# Patient Record
Sex: FEMALE | Race: WHITE | Employment: OTHER | URBAN - METROPOLITAN AREA
[De-identification: names, ages, dates, MRNs, and addresses within clinical notes are randomized per-mention and may not be internally consistent; named-entity substitution may affect disease eponyms.]

---

## 2018-05-01 ENCOUNTER — OFFICE VISIT (OUTPATIENT)
Dept: NEUROLOGY | Age: 78
End: 2018-05-01

## 2018-05-01 VITALS
DIASTOLIC BLOOD PRESSURE: 74 MMHG | WEIGHT: 106.8 LBS | HEIGHT: 62 IN | BODY MASS INDEX: 19.65 KG/M2 | SYSTOLIC BLOOD PRESSURE: 122 MMHG | HEART RATE: 99 BPM | OXYGEN SATURATION: 98 %

## 2018-05-01 DIAGNOSIS — R47.02: Primary | ICD-10-CM

## 2018-05-01 RX ORDER — DIPHENHYDRAMINE HCL 25 MG
25 CAPSULE ORAL
COMMUNITY
End: 2020-04-17

## 2018-05-01 RX ORDER — ASPIRIN 81 MG/1
TABLET ORAL DAILY
COMMUNITY
End: 2020-04-17

## 2018-05-01 RX ORDER — PREDNISOLONE ACETATE 10 MG/ML
1 SUSPENSION/ DROPS OPHTHALMIC 4 TIMES DAILY
COMMUNITY
End: 2020-04-17

## 2018-05-01 RX ORDER — SODIUM CHLORIDE 50 MG/ML
1 SOLUTION/ DROPS OPHTHALMIC AS NEEDED
COMMUNITY
End: 2020-04-17

## 2018-05-01 RX ORDER — OFLOXACIN 3 MG/ML
3 SOLUTION/ DROPS OPHTHALMIC 4 TIMES DAILY
COMMUNITY
End: 2020-04-17

## 2018-05-01 NOTE — PATIENT INSTRUCTIONS
A Healthy Lifestyle: Care Instructions  Your Care Instructions    A healthy lifestyle can help you feel good, stay at a healthy weight, and have plenty of energy for both work and play. A healthy lifestyle is something you can share with your whole family. A healthy lifestyle also can lower your risk for serious health problems, such as high blood pressure, heart disease, and diabetes. You can follow a few steps listed below to improve your health and the health of your family. Follow-up care is a key part of your treatment and safety. Be sure to make and go to all appointments, and call your doctor if you are having problems. It's also a good idea to know your test results and keep a list of the medicines you take. How can you care for yourself at home? · Do not eat too much sugar, fat, or fast foods. You can still have dessert and treats now and then. The goal is moderation. · Start small to improve your eating habits. Pay attention to portion sizes, drink less juice and soda pop, and eat more fruits and vegetables. ¨ Eat a healthy amount of food. A 3-ounce serving of meat, for example, is about the size of a deck of cards. Fill the rest of your plate with vegetables and whole grains. ¨ Limit the amount of soda and sports drinks you have every day. Drink more water when you are thirsty. ¨ Eat at least 5 servings of fruits and vegetables every day. It may seem like a lot, but it is not hard to reach this goal. A serving or helping is 1 piece of fruit, 1 cup of vegetables, or 2 cups of leafy, raw vegetables. Have an apple or some carrot sticks as an afternoon snack instead of a candy bar. Try to have fruits and/or vegetables at every meal.  · Make exercise part of your daily routine. You may want to start with simple activities, such as walking, bicycling, or slow swimming. Try to be active 30 to 60 minutes every day. You do not need to do all 30 to 60 minutes all at once.  For example, you can exercise 3 times a day for 10 or 20 minutes. Moderate exercise is safe for most people, but it is always a good idea to talk to your doctor before starting an exercise program.  · Keep moving. [place] the lawn, work in the garden, or [employer]. Take the stairs instead of the elevator at work. · If you smoke, quit. People who smoke have an increased risk for heart attack, stroke, cancer, and other lung illnesses. Quitting is hard, but there are ways to boost your chance of quitting tobacco for good. ¨ Use nicotine gum, patches, or lozenges. ¨ Ask your doctor about stop-smoking programs and medicines. ¨ Keep trying. In addition to reducing your risk of diseases in the future, you will notice some benefits soon after you stop using tobacco. If you have shortness of breath or asthma symptoms, they will likely get better within a few weeks after you quit. · Limit how much alcohol you drink. Moderate amounts of alcohol (up to 2 drinks a day for men, 1 drink a day for women) are okay. But drinking too much can lead to liver problems, high blood pressure, and other health problems. Family health  If you have a family, there are many things you can do together to improve your health. · Eat meals together as a family as often as possible. · Eat healthy foods. This includes fruits, vegetables, lean meats and dairy, and whole grains. · Include your family in your fitness plan. Most people think of activities such as jogging or tennis as the way to fitness, but there are many ways you and your family can be more active. Anything that makes you breathe hard and gets your heart pumping is exercise. Here are some tips:  ¨ Walk to do errands or to take your child to school or the bus. ¨ Go for a family bike ride after dinner instead of watching TV. Where can you learn more? Go to http://karina-zacarias.info/. Enter Z675 in the search box to learn more about \"A Healthy Lifestyle: Care Instructions. \"  Current as of: May 12, 2017  Content Version: 11.4  © 3966-5451 Healthwise, [employer]. Care instructions adapted under license by [employer]Â® (which disclaims liability or warranty for this information). If you have questions about a medical condition or this instruction, always ask your healthcare professional. [address] any warranty or liability for your use of this information. Please be advised there is a $25 fee for all paperwork to be completed from our  providers. This is to be paid by the patient prior to picking up the completed forms.

## 2018-05-01 NOTE — MR AVS SNAPSHOT
[address], 
[id number], [address] [place] [phone] Patient: Felicitas Mccormack MRN: [id number] ZYL:[id number] Visit Information Date & Time Provider Department Dept. Phone Encounter #  
 5/1/2018  8:00 AM Reba Phelps MD [facility] [phone] [id number] Follow-up Instructions Return if symptoms worsen or fail to improve. Upcoming Health Maintenance Date Due DTaP/Tdap/Td series (1 - Tdap) 5/26/1961 ZOSTER VACCINE AGE 60> 3/26/2000 GLAUCOMA SCREENING Q2Y 5/26/2005 Bone Densitometry (Dexa) Screening 5/26/2005 Pneumococcal 65+ Low/Medium Risk (1 of 2 - PCV13) 5/26/2005 MEDICARE YEARLY EXAM 3/20/2018 Influenza Age 5 to Adult 8/1/2018 Allergies as of 5/1/2018  Review Complete On: 5/1/2018 By: Isis Larson No Known Allergies Current Immunizations  Never Reviewed No immunizations on file. Not reviewed this visit You Were Diagnosed With   
  
 Codes Comments Fluent dysphasia    -  Primary ICD-10-CM: R47.02 
ICD-9-CM: 784.59 Vitals BP Pulse Height(growth percentile) Weight(growth percentile) SpO2 BMI  
 122/74 99 5' 2\" (1.575 m) 106 lb 12.8 oz (48.4 kg) 98% 19.53 kg/m2 OB Status Smoking Status Premenopausal Never Smoker BMI and BSA Data Body Mass Index Body Surface Area  
 19.53 kg/m 2 1.46 m 2 Preferred Pharmacy Pharmacy Name Phone [id number] [facility] [phone] Your Updated Medication List  
  
   
This list is accurate as of 5/1/18  8:30 AM.  Always use your most recent med list.  
  
  
  
  
 aspirin delayed-release 81 mg tablet Take  by mouth daily. BENADRYL 25 mg capsule Generic drug:  diphenhydrAMINE Take 25 mg by mouth every six (6) hours as needed. NAIF 128 5 % ophthalmic solution Generic drug:  sodium chloride Administer 1 Drop to both eyes as needed. ofloxacin 0.3 % ophthalmic solution Commonly known as:  FLOXIN Administer 3 Drops to both eyes four (4) times daily. prednisoLONE acetate 1 % ophthalmic suspension Commonly known as:  PRED FORTE Administer 1 Drop to both eyes four (4) times daily. Follow-up Instructions Return if symptoms worsen or fail to improve. To-Do List   
 05/19/2018 Imaging:  MRI BRAIN W WO CONT Patient Instructions A Healthy Lifestyle: Care Instructions Your Care Instructions A healthy lifestyle can help you feel good, stay at a healthy weight, and have plenty of energy for both work and play. A healthy lifestyle is something you can share with your whole family. A healthy lifestyle also can lower your risk for serious health problems, such as high blood pressure, heart disease, and diabetes. You can follow a few steps listed below to improve your health and the health of your family. Follow-up care is a key part of your treatment and safety. Be sure to make and go to all appointments, and call your doctor if you are having problems. It's also a good idea to know your test results and keep a list of the medicines you take. How can you care for yourself at home? · Do not eat too much sugar, fat, or fast foods. You can still have dessert and treats now and then. The goal is moderation. · Start small to improve your eating habits. Pay attention to portion sizes, drink less juice and soda pop, and eat more fruits and vegetables. ¨ Eat a healthy amount of food. A 3-ounce serving of meat, for example, is about the size of a deck of cards. Fill the rest of your plate with vegetables and whole grains. ¨ Limit the amount of soda and sports drinks you have every day. Drink more water when you are thirsty. ¨ Eat at least 5 servings of fruits and vegetables every day.  It may seem like a lot, but it is not hard to reach this goal. A serving or helping is 1 piece of fruit, 1 cup of vegetables, or 2 cups of leafy, raw vegetables. Have an apple or some carrot sticks as an afternoon snack instead of a candy bar. Try to have fruits and/or vegetables at every meal. 
· Make exercise part of your daily routine. You may want to start with simple activities, such as walking, bicycling, or slow swimming. Try to be active 30 to 60 minutes every day. You do not need to do all 30 to 60 minutes all at once. For example, you can exercise 3 times a day for 10 or 20 minutes. Moderate exercise is safe for most people, but it is always a good idea to talk to your doctor before starting an exercise program. 
· Keep moving. [place] [employer] the lawn, work in the garden, or [employer]. Take the stairs instead of the elevator at work. · If you smoke, quit. People who smoke have an increased risk for heart attack, stroke, cancer, and other lung illnesses. Quitting is hard, but there are ways to boost your chance of quitting tobacco for good. ¨ Use nicotine gum, patches, or lozenges. ¨ Ask your doctor about stop-smoking programs and medicines. ¨ Keep trying. In addition to reducing your risk of diseases in the future, you will notice some benefits soon after you stop using tobacco. If you have shortness of breath or asthma symptoms, they will likely get better within a few weeks after you quit. · Limit how much alcohol you drink. Moderate amounts of alcohol (up to 2 drinks a day for men, 1 drink a day for women) are okay. But drinking too much can lead to liver problems, high blood pressure, and other health problems. Family health If you have a family, there are many things you can do together to improve your health. · Eat meals together as a family as often as possible. · Eat healthy foods. This includes fruits, vegetables, lean meats and dairy, and whole grains. · Include your family in your fitness plan. Most people think of activities such as jogging or tennis as the way to fitness, but there are many ways you and your family can be more active. Anything that makes you breathe hard and gets your heart pumping is exercise. Here are some tips: 
¨ Walk to do errands or to take your child to school or the bus. ¨ Go for a family bike ride after dinner instead of watching TV. Where can you learn more? Go to http://karina-zacarias.info/. Enter B782 in the search box to learn more about \"A Healthy Lifestyle: Care Instructions. \" Current as of: May 12, 2017 Content Version: 11.4 © 0857-0202 [employer]. Care instructions adapted under license by [employer] (which disclaims liability or warranty for this information). If you have questions about a medical condition or this instruction, always ask your healthcare professional. [place] [address] any warranty or liability for your use of this information. Please be advised there is a $25 fee for all paperwork to be completed from our  providers. This is to be paid by the patient prior to picking up the completed forms. Introducing [facility]! [employer] introduces Twice patient portal. Now you can access parts of your medical record, email your doctor's office, and request medication refills online. 1. In your internet browser, go to [email or web address] 2. Click on the First Time User? Click Here link in the Sign In box. You will see the New Member Sign Up page. 3. Enter your Twice Access Code exactly as it appears below. You will not need to use this code after youve completed the sign-up process. If you do not sign up before the expiration date, you must request a new code. · Twice Access Code: [email or web address] Expires: 7/30/2018  7:42 AM 
 
4.  Enter the last four digits of your Social Security Number (xxxx) and Date of Birth (mm/dd/yyyy) as indicated and click Submit. You will be taken to the next sign-up page. 5. Create a DentLight ID. This will be your DentLight login ID and cannot be changed, so think of one that is secure and easy to remember. 6. Create a DentLight password. You can change your password at any time. 7. Enter your Password Reset Question and Answer. This can be used at a later time if you forget your password. 8. Enter your e-mail address. You will receive e-mail notification when new information is available in [address] 9. Click Sign Up. You can now view and download portions of your medical record. 10. Click the Download Summary menu link to download a portable copy of your medical information. If you have questions, please visit the Frequently Asked Questions section of the DentLight website. Remember, DentLight is NOT to be used for urgent needs. For medical emergencies, dial 911. Now available from your iPhone and Android! Please provide this summary of care documentation to your next provider. Your primary care clinician is listed as PROVIDER UNKNOWN. If you have any questions after today's visit, please call [phone].

## 2018-05-01 NOTE — PROGRESS NOTES
HISTORY OF PRESENT ILLNESS  Willette Spurling is a 68 y.o. female. HPI Comments: This patient is a 66-year-old right-handed  female who comes in today stating that her children are concerned that she is dementing. She states he sometimes searches for words, her children state that she is forgetful and are worried about her. She does not believe she has a problem except she does admit to word finding difficulties. She is only on aspirin 81 mg a day. She is remarkably healthy. She did have cataract surgery twice in April 2018. She has one paternal aunt who was demented at death. She otherwise has a family history of cancer heart disease Parkinson's disease in a paternal grandmother and stroke in multiple individuals. Her mother's sister and multiple remote relatives have diabetes, she does not. She denies any problems getting lost driving the car she feels she is functioning normally. New Patient   The history is provided by the patient. This is a chronic problem. Review of Systems   Constitutional:        View of systems is positive for anxiety occasionally, fatigue occasionally, hearing loss, she has hearing aids. Occasional memory problems, occasional muscle weakness poor appetite a rash from her psoriasis, complete review of systems done all others negative     Medications  Aspirin 81 mg a day, Benadryl 25 mg 2 nightly for sleep,    Past medical history  She has had multiple surgeries, appendectomy, uterine surgery, cataract. Family history as noted above. Social history, she lives alone she does not smoke he does not drink alcohol. He continues to operate a motor vehicle. Physical Exam   She scored 29 out of 30 on her MMSE, the only point lost was on the ability to repeat a tongue twister phrase which we tried twice and probably is significant. Constitutional: Oriented to person, place, and time, appears well-developed and well-nourished. No distress.    HENT:   Head: Normocephalic and atraumatic. Mouth/Throat: Oropharynx is clear and moist. No oropharyngeal exudate. Eyes: Conjunctivae and EOM are normal. Pupils are equal, round, and reactive to light. No scleral icterus. Neck: Normal range of motion. Neck supple. No thyromegaly present. Cardiovascular: Normal rate, regular rhythm and normal heart sounds. Musculoskeletal: Normal range of motion, exhibits no edema, tenderness or deformity. Lymphadenopathy: no cervical adenopathy. Neurological: Alert and oriented to person, place, and time. Normal strength and normal reflexes. Displays no atrophy and no tremor. No cranial nerve deficit or sensory deficit. Exhibits normal muscle tone. Displays a negative Romberg sign, no seizure activity. Coordination normal, gait normal.   No Babinski's sign on the right side. No Babinski's sign on the left side. Speech, language and mentation are normal  Visual fields are full to confrontation, funduscopic exam reveals flat discs, the retina and vasculature are normal   Skin: Skin is warm and dry. No rash noted, not diaphoretic. No erythema. Psychiatric: Normal mood and affect,  behavior is normal. Judgment and thought content normal.   Vitals reviewed. ASSESSMENT and PLAN  DYSPHASIA  I suspect this patient does have a dysphasia, her only error was on the word repetition. She certainly does not have a dementia. I will set her up for an MRI scan of her brain with contrast to make sure that I do not see any abnormality in the left temporoparietal area. It would not surprise me if I saw a small lacunar infarct in that area. If her scan is normal I am not going to pursue this further. If she does have a small stroke in that area we will give her a stroke workup. She is currently taking aspirin 81 mg a day which I recommend she continue. I will plan to see her back on an as-needed basis. This note will not be viewable in [address]

## 2018-05-10 ENCOUNTER — HOSPITAL ENCOUNTER (OUTPATIENT)
Dept: MRI IMAGING | Age: 78
Discharge: HOME | End: 2018-05-10
Attending: PSYCHIATRY & NEUROLOGY
Payer: MEDICARE

## 2018-05-10 DIAGNOSIS — R47.02: ICD-10-CM

## 2018-05-10 PROCEDURE — 74011250636 HC RX REV CODE- 250/636: Performed by: PSYCHIATRY & NEUROLOGY

## 2018-05-10 PROCEDURE — A9575 INJ GADOTERATE MEGLUMI 0.1ML: HCPCS | Performed by: PSYCHIATRY & NEUROLOGY

## 2018-05-10 PROCEDURE — 70553 MRI BRAIN STEM W/O & W/DYE: CPT

## 2018-05-10 RX ORDER — GADOTERATE MEGLUMINE 376.9 MG/ML
9 INJECTION INTRAVENOUS
Status: COMPLETED | OUTPATIENT
Start: 2018-05-10 | End: 2018-05-10

## 2018-05-10 RX ADMIN — GADOTERATE MEGLUMINE 9 ML: 376.9 INJECTION INTRAVENOUS at 14:00

## 2019-02-26 ENCOUNTER — TELEPHONE (OUTPATIENT)
Dept: NEUROLOGY | Age: 79
End: 2019-02-26

## 2019-05-21 ENCOUNTER — TELEPHONE (OUTPATIENT)
Dept: NEUROLOGY | Age: 79
End: 2019-05-21

## 2019-06-06 ENCOUNTER — OFFICE VISIT (OUTPATIENT)
Dept: NEUROLOGY | Age: 79
End: 2019-06-06

## 2019-06-06 VITALS
HEIGHT: 62 IN | BODY MASS INDEX: 19.88 KG/M2 | OXYGEN SATURATION: 97 % | WEIGHT: 108 LBS | HEART RATE: 81 BPM | DIASTOLIC BLOOD PRESSURE: 80 MMHG | SYSTOLIC BLOOD PRESSURE: 110 MMHG

## 2019-06-06 DIAGNOSIS — F03.90 DEMENTIA WITHOUT BEHAVIORAL DISTURBANCE, UNSPECIFIED DEMENTIA TYPE: Primary | ICD-10-CM

## 2019-06-06 RX ORDER — MEMANTINE HYDROCHLORIDE 10 MG/1
TABLET ORAL
Qty: 60 TAB | Refills: 11 | Status: SHIPPED | OUTPATIENT
Start: 2019-06-06 | End: 2020-04-17

## 2019-06-06 RX ORDER — DONEPEZIL HYDROCHLORIDE 10 MG/1
TABLET, FILM COATED ORAL
Qty: 30 TAB | Refills: 5 | Status: SHIPPED | OUTPATIENT
Start: 2019-06-06 | End: 2020-04-17

## 2019-06-06 RX ORDER — TRIAMCINOLONE ACETONIDE 55 UG/1
2 SPRAY, METERED NASAL DAILY
COMMUNITY
End: 2020-04-17

## 2019-06-06 NOTE — PROGRESS NOTES
HISTORY OF PRESENT ILLNESS  Shona Vyas is a 78 y.o. female. This patient is a 51-year-old right-handed  female who comes in today stating that her children are concerned that she is dementing. She states he sometimes searches for words, her children state that she is forgetful and are worried about her. She does not believe she has a problem except she does admit to word finding difficulties. She is only on aspirin 81 mg a day. She is remarkably healthy. She did have cataract surgery twice in April 2018. She has one paternal aunt who was demented at death. She otherwise has a family history of cancer heart disease Parkinson's disease in a paternal grandmother and stroke in multiple individuals. Her mother's sister and multiple remote relatives have diabetes, she does not. She denies any problems getting lost driving the car she feels she is functioning normally. Her son comes with her today and he states that her memory is failing, she clearly has any aphasia, this is much more pronounced than any other deficit. She does very well on the MMSE again with a score of 24 however the son states that she is often very forgetful at home. She does operate a motor vehicle and does not get lost.    New Patient   The history is provided by the patient. This is a chronic problem. Review of Systems   Constitutional:        View of systems is positive for anxiety occasionally, fatigue occasionally, hearing loss, she has hearing aids. Occasional memory problems, occasional muscle weakness poor appetite a rash from her psoriasis, complete review of systems done all others negative       Current Outpatient Medications:     triamcinolone (NASACORT AQ) 55 mcg nasal inhaler, 2 Sprays daily. 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 , Disp: , Rfl:     aspirin delayed-release 81 mg tablet, Take  by mouth daily. , Disp: , Rfl:     prednisoLONE acetate (PRED FORTE) 1 % ophthalmic suspension, Administer 1 Drop to both eyes four (4) times daily. , Disp: , Rfl:     sodium chloride (NAIF 128) 5 % ophthalmic solution, Administer 1 Drop to both eyes as needed. , Disp: , Rfl:     ofloxacin (FLOXIN) 0.3 % ophthalmic solution, Administer 3 Drops to both eyes four (4) times daily. , Disp: , Rfl:     History reviewed. No pertinent past medical history. No family history on file. Social History     Tobacco Use    Smoking status: Never Smoker    Smokeless tobacco: Current User   Substance Use Topics    Alcohol use: No    Drug use: No     /80   Pulse 81   Ht 5' 2\" (1.575 m)   Wt 108 lb (49 kg)   SpO2 97%   BMI 19.75 kg/m²     Physical Exam   She scored 26 out of 30 on her MMSE, the only point lost was on the ability to repeat a tongue twister phrase which we tried twice and probably is significant. Constitutional: Oriented to person, place, and time, appears well-developed and well-nourished. No distress. HENT:   Head: Normocephalic and atraumatic. Mouth/Throat: Oropharynx is clear and moist. No oropharyngeal exudate. Eyes: Conjunctivae and EOM are normal. Pupils are equal, round, and reactive to light. No scleral icterus. Neck: Normal range of motion. Neck supple. No thyromegaly present. Cardiovascular: Normal rate, regular rhythm and normal heart sounds. Musculoskeletal: Normal range of motion, exhibits no edema, tenderness or deformity. Lymphadenopathy: no cervical adenopathy. Neurological: Alert and oriented to person, place, and time. Normal strength and normal reflexes.    Displays no atrophy and no tremor. No cranial nerve deficit or sensory deficit. Exhibits normal muscle tone. Displays a negative Romberg sign, no seizure activity. Coordination normal, gait normal.   No Babinski's sign on the right side. No Babinski's sign on the left side. Speech, language and mentation are normal  Visual fields are full to confrontation, funduscopic exam reveals flat discs, the retina and vasculature are normal   Skin: Skin is warm and dry. No rash noted, not diaphoretic. No erythema. Psychiatric: Normal mood and affect,  behavior is normal. Judgment and thought content normal.   Vitals reviewed. MRI Results (most recent):  Results from [place] encounter on 05/10/18   MRI BRAIN W WO CONT    Narrative Clinical indication: Dysphasia    Technical factors: Diffusion imaging, sagittal T1-weighted, axial T1-weighted  T2-weighted FLAIR gradient echo axial T1-weighted postcontrast, 9 cc IV dotarem. Coronal T1-weighted postcontrast.    Diffusion imaging does not show acute ischemic changes her. There is no  extra-axial fluid collection hemorrhage or shift. Minimal nonspecific white  matter disease. Ventricles are normal in size and midline, major flow voids in  the vessels at the base of the brain are present. No enhancing lesion or masses  . Impression  impression: Minimal white matter disease, no other significant findings. 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 Her MRI scan is normal, there is nothing else to be done besides to try her on memory medications. I will start her off on donepezil 10 mg, half a tablet a day for 30 days and then up to 1 whole tablet a day. She will then start memantine 10 mg a day for 30 days and then 10 mg twice a day. I will see her back in 6 months.

## 2019-06-06 NOTE — LETTER
6/6/2019 11:59 AM 
 
Patient:  Collin Pelaez YOB: 1940 Date of Visit: 6/6/2019 Dear Luz Bello MD 
[address] [place] [address] Road [address] [place] [address] VIA In Basket 
 : Thank you for referring Ms. Isamar Wadsworth to me for evaluation/treatment. Below are the relevant portions of my assessment and plan of care. HISTORY OF PRESENT ILLNESS Collin Pelaez is a 78 y.o. female. This patient is a 79-year-old right-handed  female who comes in today stating that her children are concerned that she is dementing. She states he sometimes searches for words, her children state that she is forgetful and are worried about her. She does not believe she has a problem except she does admit to word finding difficulties. She is only on aspirin 81 mg a day. She is remarkably healthy. She did have cataract surgery twice in April 2018. She has one paternal aunt who was demented at death. She otherwise has a family history of cancer heart disease Parkinson's disease in a paternal grandmother and stroke in multiple individuals. Her mother's sister and multiple remote relatives have diabetes, she does not. She denies any problems getting lost driving the car she feels she is functioning normally. New Patient The history is provided by the patient. This is a chronic problem. Review of Systems Constitutional:  
     View of systems is positive for anxiety occasionally, fatigue occasionally, hearing loss, she has hearing aids. Occasional memory problems, occasional muscle weakness poor appetite a rash from her psoriasis, complete review of systems done all others negative Medications Aspirin 81 mg a day, Benadryl 25 mg 2 nightly for sleep, Past medical history She has had multiple surgeries, appendectomy, uterine surgery, cataract. Family history as noted above.  
 
Social history, she lives alone she does not smoke he does not drink alcohol. He continues to operate a motor vehicle. Physical Exam  
She scored 29 out of 30 on her MMSE, the only point lost was on the ability to repeat a tongue twister phrase which we tried twice and probably is not significant. Constitutional: Oriented to person, place, and time, appears well-developed and well-nourished. No distress. HENT:  
Head: Normocephalic and atraumatic. Mouth/Throat: Oropharynx is clear and moist. No oropharyngeal exudate. Eyes: Conjunctivae and EOM are normal. Pupils are equal, round, and reactive to light. No scleral icterus. Neck: Normal range of motion. Neck supple. No thyromegaly present. Cardiovascular: Normal rate, regular rhythm and normal heart sounds. Musculoskeletal: Normal range of motion, exhibits no edema, tenderness or deformity. Lymphadenopathy: no cervical adenopathy. Neurological: Alert and oriented to person, place, and time. Normal strength and normal reflexes. Displays no atrophy and no tremor. No cranial nerve deficit or sensory deficit. Exhibits normal muscle tone. Displays a negative Romberg sign, no seizure activity. Coordination normal, gait normal.  
No Babinski's sign on the right side. No Babinski's sign on the left side. Speech, language and mentation are normal 
Visual fields are full to confrontation, funduscopic exam reveals flat discs, the retina and vasculature are normal  
Skin: Skin is warm and dry. No rash noted, not diaphoretic. No erythema. Psychiatric: Normal mood and affect,  behavior is normal. Judgment and thought content normal.  
Vitals reviewed. MRI Results (most recent): 
Results from Hospital Encounter encounter on 05/10/18 MRI BRAIN W WO CONT Narrative Clinical indication: Dysphasia Technical factors: Diffusion imaging, sagittal T1-weighted, axial T1-weighted T2-weighted FLAIR gradient echo axial T1-weighted postcontrast, 9 cc IV dotarem.  
Coronal T1-weighted postcontrast. 
 
 Diffusion imaging does not show acute ischemic changes her. There is no 
extra-axial fluid collection hemorrhage or shift. Minimal nonspecific white 
matter disease. Ventricles are normal in size and midline, major flow voids in 
the vessels at the base of the brain are present. No enhancing lesion or masses Chris De La Cruz Impression  impression: Minimal white matter disease, no other significant findings. ASSESSMENT and PLAN 
DYSPHASIA I suspect this patient does have a dysphasia, her only error was on the word repetition. She certainly does not have a dementia. I will set her up for an MRI scan of her brain with contrast to make sure that I do not see any abnormality in the left temporoparietal area. It would not surprise me if I saw a small lacunar infarct in that area. If her scan is normal I am not going to pursue this further. If she does have a small stroke in that area we will give her a stroke workup. She is currently taking aspirin 81 mg a day which I recommend she continue. I will plan to see her back on an as-needed basis. This note will not be viewable in [address] HISTORY OF PRESENT ILLNESS Jonathan Pickett is a 78 y.o. female. This patient is a 80-year-old right-handed  female who comes in today stating that her children are concerned that she is dementing. She states he sometimes searches for words, her children state that she is forgetful and are worried about her. She does not believe she has a problem except she does admit to word finding difficulties. She is only on aspirin 81 mg a day. She is remarkably healthy. She did have cataract surgery twice in April 2018. She has one paternal aunt who was demented at death. She otherwise has a family history of cancer heart disease Parkinson's disease in a paternal grandmother and stroke in multiple individuals.   Her mother's sister and multiple remote relatives have diabetes, she does not. She denies any problems getting lost driving the car she feels she is functioning normally. Her son comes with her today and he states that her memory is failing, she clearly has any aphasia, this is much more pronounced than any other deficit. She does very well on the MMSE again with a score of 24 however the son states that she is often very forgetful at home. She does operate a motor vehicle and does not get lost. 
 
New Patient The history is provided by the patient. This is a chronic problem. Review of Systems Constitutional:  
     View of systems is positive for anxiety occasionally, fatigue occasionally, hearing loss, she has hearing aids. Occasional memory problems, occasional muscle weakness poor appetite a rash from her psoriasis, complete review of systems done all others negative Current Outpatient Medications:  
  triamcinolone (NASACORT AQ) 55 mcg nasal inhaler, 2 Sprays daily. , Disp: , Rfl:  
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 
  memantine (NAMENDA) 10 mg tablet, 1 po q  Day for 30 days the 1 po bid, may start after at full dose of donepexil for 30 days, Disp: 60 Tab, Rfl: 11 
  diphenhydrAMINE (BENADRYL) 25 mg capsule, Take 25 mg by mouth every six (6) hours as needed. , Disp: , Rfl:  
  aspirin delayed-release 81 mg tablet, Take  by mouth daily. , Disp: , Rfl:  
  prednisoLONE acetate (PRED FORTE) 1 % ophthalmic suspension, Administer 1 Drop to both eyes four (4) times daily. , Disp: , Rfl:  
  sodium chloride (NAIF 128) 5 % ophthalmic solution, Administer 1 Drop to both eyes as needed. , Disp: , Rfl:  
  ofloxacin (FLOXIN) 0.3 % ophthalmic solution, Administer 3 Drops to both eyes four (4) times daily. , Disp: , Rfl:  
 
History reviewed. No pertinent past medical history. No family history on file. Social History Tobacco Use  Smoking status: Never Smoker  Smokeless tobacco: Current User Substance Use Topics  Alcohol use: No  
 Drug use: No  
 
/80   Pulse 81   Ht 5' 2\" (1.575 m)   Wt 108 lb (49 kg)   SpO2 97%   BMI 19.75 kg/m² Physical Exam  
She scored 26 out of 30 on her MMSE, the only point lost was on the ability to repeat a tongue twister phrase which we tried twice and probably is significant. Constitutional: Oriented to person, place, and time, appears well-developed and well-nourished. No distress. HENT:  
Head: Normocephalic and atraumatic. Mouth/Throat: Oropharynx is clear and moist. No oropharyngeal exudate. Eyes: Conjunctivae and EOM are normal. Pupils are equal, round, and reactive to light. No scleral icterus. Neck: Normal range of motion. Neck supple. No thyromegaly present. Cardiovascular: Normal rate, regular rhythm and normal heart sounds. Musculoskeletal: Normal range of motion, exhibits no edema, tenderness or deformity. Lymphadenopathy: no cervical adenopathy. Neurological: Alert and oriented to person, place, and time. Normal strength and normal reflexes. Displays no atrophy and no tremor. No cranial nerve deficit or sensory deficit. Exhibits normal muscle tone. Displays a negative Romberg sign, no seizure activity. Coordination normal, gait normal.  
No Babinski's sign on the right side. No Babinski's sign on the left side. Speech, language and mentation are normal 
Visual fields are full to confrontation, funduscopic exam reveals flat discs, the retina and vasculature are normal  
Skin: Skin is warm and dry. No rash noted, not diaphoretic. No erythema. Psychiatric: Normal mood and affect,  behavior is normal. Judgment and thought content normal.  
Vitals reviewed. MRI Results (most recent): 
Results from Hospital Encounter encounter on 05/10/18 MRI BRAIN W WO CONT Narrative Clinical indication: Dysphasia Technical factors: Diffusion imaging, sagittal T1-weighted, axial T1-weighted T2-weighted FLAIR gradient echo axial T1-weighted postcontrast, 9 cc IV dotarem. Coronal T1-weighted postcontrast. 
 
Diffusion imaging does not show acute ischemic changes her. There is no 
extra-axial fluid collection hemorrhage or shift. Minimal nonspecific white 
matter disease. Ventricles are normal in size and midline, major flow voids in 
the vessels at the base of the brain are present. No enhancing lesion or masses Sita Riser Impression  impression: Minimal white matter disease, no other significant findings. ASSESSMENT and PLAN 
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 Her MRI scan is normal, there is nothing else to be done besides to try her on memory medications. I will start her off on donepezil 10 mg, half a tablet a day for 30 days and then up to 1 whole tablet a day. She will then start memantine 10 mg a day for 30 days and then 10 mg twice a day. I will see her back in 6 months. If you have questions, please do not hesitate to call me. I look forward to following Ms. Kassandra Childs along with you. Sincerely, Tequila Mcneal MD

## 2019-06-06 NOTE — PATIENT INSTRUCTIONS
A Healthy Lifestyle: Care Instructions  Your Care Instructions    A healthy lifestyle can help you feel good, stay at a healthy weight, and have plenty of energy for both work and play. A healthy lifestyle is something you can share with your whole family. A healthy lifestyle also can lower your risk for serious health problems, such as high blood pressure, heart disease, and diabetes. You can follow a few steps listed below to improve your health and the health of your family. Follow-up care is a key part of your treatment and safety. Be sure to make and go to all appointments, and call your doctor if you are having problems. It's also a good idea to know your test results and keep a list of the medicines you take. How can you care for yourself at home? · Do not eat too much sugar, fat, or fast foods. You can still have dessert and treats now and then. The goal is moderation. · Start small to improve your eating habits. Pay attention to portion sizes, drink less juice and soda pop, and eat more fruits and vegetables. ? Eat a healthy amount of food. A 3-ounce serving of meat, for example, is about the size of a deck of cards. Fill the rest of your plate with vegetables and whole grains. ? Limit the amount of soda and sports drinks you have every day. Drink more water when you are thirsty. ? Eat at least 5 servings of fruits and vegetables every day. It may seem like a lot, but it is not hard to reach this goal. A serving or helping is 1 piece of fruit, 1 cup of vegetables, or 2 cups of leafy, raw vegetables. Have an apple or some carrot sticks as an afternoon snack instead of a candy bar. Try to have fruits and/or vegetables at every meal.  · Make exercise part of your daily routine. You may want to start with simple activities, such as walking, bicycling, or slow swimming. Try to be active 30 to 60 minutes every day. You do not need to do all 30 to 60 minutes all at once.  For example, you can exercise 3 times a day for 10 or 20 minutes. Moderate exercise is safe for most people, but it is always a good idea to talk to your doctor before starting an exercise program.  · Keep moving. John Sang the lawn, work in the garden, or [employer]. Take the stairs instead of the elevator at work. · If you smoke, quit. People who smoke have an increased risk for heart attack, stroke, cancer, and other lung illnesses. Quitting is hard, but there are ways to boost your chance of quitting tobacco for good. ? Use nicotine gum, patches, or lozenges. ? Ask your doctor about stop-smoking programs and medicines. ? Keep trying. In addition to reducing your risk of diseases in the future, you will notice some benefits soon after you stop using tobacco. If you have shortness of breath or asthma symptoms, they will likely get better within a few weeks after you quit. · Limit how much alcohol you drink. Moderate amounts of alcohol (up to 2 drinks a day for men, 1 drink a day for women) are okay. But drinking too much can lead to liver problems, high blood pressure, and other health problems. Family health  If you have a family, there are many things you can do together to improve your health. · Eat meals together as a family as often as possible. · Eat healthy foods. This includes fruits, vegetables, lean meats and dairy, and whole grains. · Include your family in your fitness plan. Most people think of activities such as jogging or tennis as the way to fitness, but there are many ways you and your family can be more active. Anything that makes you breathe hard and gets your heart pumping is exercise. Here are some tips:  ? Walk to do errands or to take your child to school or the bus.  ? Go for a family bike ride after dinner instead of watching TV. Where can you learn more? Go to http://karina-zacarias.info/. Enter Q151 in the search box to learn more about \"A Healthy Lifestyle: Care Instructions. \"  Current as of: September 11, 2018  Content Version: 11.9  © 2371-8999 [employer]. Care instructions adapted under license by [employer] (which disclaims liability or warranty for this information). If you have questions about a medical condition or this instruction, always ask your healthcare professional. [address] any warranty or liability for your use of this information.

## 2019-12-03 ENCOUNTER — TELEPHONE (OUTPATIENT)
Dept: NEUROLOGY | Age: 79
End: 2019-12-03

## 2020-04-17 ENCOUNTER — HOSPITAL ENCOUNTER (EMERGENCY)
Age: 80
Discharge: HOME | End: 2020-04-17
Attending: EMERGENCY MEDICINE | Admitting: EMERGENCY MEDICINE
Payer: MEDICARE

## 2020-04-17 VITALS
RESPIRATION RATE: 18 BRPM | DIASTOLIC BLOOD PRESSURE: 92 MMHG | SYSTOLIC BLOOD PRESSURE: 146 MMHG | TEMPERATURE: 98 F | WEIGHT: 108.47 LBS | OXYGEN SATURATION: 99 % | BODY MASS INDEX: 19.96 KG/M2 | HEART RATE: 110 BPM | HEIGHT: 62 IN

## 2020-04-17 DIAGNOSIS — S61.313A LACERATION OF LEFT MIDDLE FINGER WITHOUT FOREIGN BODY WITH DAMAGE TO NAIL, INITIAL ENCOUNTER: Primary | ICD-10-CM

## 2020-04-17 PROCEDURE — 90471 IMMUNIZATION ADMIN: CPT

## 2020-04-17 PROCEDURE — 74011250636 HC RX REV CODE- 250/636: Performed by: PHYSICIAN ASSISTANT

## 2020-04-17 PROCEDURE — 90715 TDAP VACCINE 7 YRS/> IM: CPT | Performed by: PHYSICIAN ASSISTANT

## 2020-04-17 PROCEDURE — 99283 EMERGENCY DEPT VISIT LOW MDM: CPT

## 2020-04-17 RX ADMIN — TETANUS TOXOID, REDUCED DIPHTHERIA TOXOID AND ACELLULAR PERTUSSIS VACCINE, ADSORBED 0.5 ML: 5; 2.5; 8; 8; 2.5 SUSPENSION INTRAMUSCULAR at 15:50

## 2020-04-17 NOTE — ED PROVIDER NOTES
EMERGENCY DEPARTMENT HISTORY AND PHYSICAL EXAM      Date: 4/17/2020  Patient Name: Ashia Kennedy    History of Presenting Illness     Chief Complaint   Patient presents with    Laceration     She accidentally took a little chunk out of her fingernail with a . Right middle. History Provided By: Patient    HPI: Ashia Kennedy, 78 y.o. female presents ambulatory to the ED with cc of an hour or so of mild but constant sharp pain to the right middle finger that is worse with palpation and started after she accidentally cut the tip of her finger and fingernail with a . Her tetanus is not up-to-date. She is right-hand dominant. There are no other injuries. There are no other complaints, changes, or physical findings at this time. PCP: Last Martinez MD      Past History     Past Medical History:  History reviewed. No pertinent past medical history. Past Surgical History:  History reviewed. No pertinent surgical history. Family History:  History reviewed. No pertinent family history. Social History:  Social History     Tobacco Use    Smoking status: Never Smoker    Smokeless tobacco: Current User   Substance Use Topics    Alcohol use: No    Drug use: No       Allergies:  No Known Allergies  Review of Systems   Review of Systems   Constitutional: Negative for fatigue and fever. HENT: Negative for ear pain and sore throat. Eyes: Negative for pain, redness and visual disturbance. Respiratory: Negative for cough and shortness of breath. Cardiovascular: Negative for chest pain and palpitations. Gastrointestinal: Negative for abdominal pain, nausea and vomiting. Genitourinary: Negative for dysuria, frequency and urgency. Musculoskeletal: Negative for back pain, gait problem, neck pain and neck stiffness. Skin: Positive for wound. Negative for rash. Neurological: Negative for dizziness, weakness, light-headedness, numbness and headaches.      Physical Exam   Physical Exam  Vitals signs and nursing note reviewed. Constitutional:       General: She is not in acute distress. Appearance: She is well-developed. She is not toxic-appearing. HENT:      Head: Normocephalic and atraumatic. Jaw: No trismus. Right Ear: External ear normal.      Left Ear: External ear normal.      Nose: Nose normal.      Mouth/Throat:      Pharynx: Uvula midline. Eyes:      General: No scleral icterus. Conjunctiva/sclera: Conjunctivae normal.      Pupils: Pupils are equal, round, and reactive to light. Neck:      Musculoskeletal: Full passive range of motion without pain and normal range of motion. Cardiovascular:      Rate and Rhythm: Normal rate and regular rhythm. Pulmonary:      Effort: Pulmonary effort is normal. No tachypnea, accessory muscle usage or respiratory distress. Breath sounds: No decreased breath sounds or wheezing. Abdominal:      Palpations: Abdomen is soft. Tenderness: There is no abdominal tenderness. Musculoskeletal: Normal range of motion. Right hand: She exhibits laceration. Hands:       Comments: RIGHT MIDDLE FINGER:  Some loss of nail with exposed nailbed without zoe laceration   Skin:     Findings: No rash. Neurological:      Mental Status: She is alert and oriented to person, place, and time. She is not disoriented. GCS: GCS eye subscore is 4. GCS verbal subscore is 5. GCS motor subscore is 6. Cranial Nerves: No cranial nerve deficit. Psychiatric:         Speech: Speech normal.       Diagnostic Study Results     Labs -   No results found for this or any previous visit (from the past 12 hour(s)). Radiologic Studies -   No orders to display     CT Results  (Last 48 hours)    None        CXR Results  (Last 48 hours)    None        Medical Decision Making   I am the first provider for this patient.     I reviewed the vital signs, available nursing notes, past medical history, past surgical history, family history and social history. Vital Signs-Reviewed the patient's vital signs. Patient Vitals for the past 12 hrs:   Temp Pulse Resp BP SpO2   04/17/20 1451 98 °F (36.7 °C) (!) 110 18 (!) 146/92 99 %       Pulse Oximetry Analysis - 99% on RA    Records Reviewed: Nursing Notes, Old Medical Records, Previous Radiology Studies and Previous Laboratory Studies    Provider Notes (Medical Decision Making):   Patient presents with an avulsion of part of his nail with exposed nailbed and no zoe laceration for repair. The mechanism makes it unlikely that there is a fracture or foreign body and therefore imaging will be deferred. We did update the patient's tetanus while she was here. I trimmed the tip of her exposed nail to be more flush with the finger and then applied a bulky nonstick dressing. ED Course:   Initial assessment performed. The patients presenting problems have been discussed, and they are in agreement with the care plan formulated and outlined with them. I have encouraged them to ask questions as they arise throughout their visit. Disposition:  Discharge    PLAN:  1. There are no discharge medications for this patient. 2.   Follow-up Information     Follow up With Specialties Details Why Contact Info    Yani Her MD Family Practice Schedule an appointment as soon as possible for a visit As needed [facility] [phone]          Return to ED if worse     Diagnosis     Clinical Impression:   1.  Laceration of left middle finger without foreign body with damage to nail, initial encounter

## 2020-04-17 NOTE — ED NOTES
Patient arrives ambulatory after cutting a piece of her fingernail off w the . [id number]- Discharge instructions given to patient by BRITTANY Chen. Verbalized understanding of instructions. Patient discharged without difficulty. Patient discharged in stable condition ambulatory.

## 2021-01-28 ENCOUNTER — TRANSCRIBE ORDER (OUTPATIENT)
Dept: SCHEDULING | Age: 81
End: 2021-01-28

## 2021-01-28 DIAGNOSIS — R42 DIZZINESS: ICD-10-CM

## 2021-01-28 DIAGNOSIS — R53.1 WEAKNESS: Primary | ICD-10-CM

## 2021-02-05 ENCOUNTER — HOSPITAL ENCOUNTER (OUTPATIENT)
Dept: VASCULAR SURGERY | Age: 81
Discharge: HOME | End: 2021-02-05
Attending: NURSE PRACTITIONER
Payer: MEDICARE

## 2021-02-05 ENCOUNTER — HOSPITAL ENCOUNTER (OUTPATIENT)
Dept: MRI IMAGING | Age: 81
Discharge: HOME | End: 2021-02-05
Attending: NURSE PRACTITIONER
Payer: MEDICARE

## 2021-02-05 DIAGNOSIS — R53.1 WEAKNESS: ICD-10-CM

## 2021-02-05 DIAGNOSIS — R42 DIZZINESS: ICD-10-CM

## 2021-02-05 PROCEDURE — 93880 EXTRACRANIAL BILAT STUDY: CPT

## 2021-02-05 PROCEDURE — 70551 MRI BRAIN STEM W/O DYE: CPT

## 2021-02-07 LAB
LEFT CCA DIST DIAS: 19.9 CM/S
LEFT CCA DIST SYS: 67.3 CM/S
LEFT CCA PROX DIAS: 10.8 CM/S
LEFT CCA PROX SYS: 63.7 CM/S
LEFT ECA DIAS: 7.1 CM/S
LEFT ECA SYS: 76.4 CM/S
LEFT ICA DIST DIAS: 27.2 CM/S
LEFT ICA DIST SYS: 72.8 CM/S
LEFT ICA MID DIAS: 18.1 CM/S
LEFT ICA MID SYS: 58.2 CM/S
LEFT ICA PROX DIAS: 14.4 CM/S
LEFT ICA PROX SYS: 56.4 CM/S
LEFT ICA/CCA SYS: 1.1
LEFT SUBCLAVIAN DIAS: 0 CM/S
LEFT SUBCLAVIAN SYS: 106.4 CM/S
LEFT VERTEBRAL DIAS: 10.8 CM/S
LEFT VERTEBRAL SYS: 40 CM/S
RIGHT CCA DIST DIAS: 18.1 CM/S
RIGHT CCA DIST SYS: 52 CM/S
RIGHT CCA PROX DIAS: 13.8 CM/S
RIGHT CCA PROX SYS: 59.5 CM/S
RIGHT ECA DIAS: 8.9 CM/S
RIGHT ECA SYS: 60.1 CM/S
RIGHT ICA DIST DIAS: 40 CM/S
RIGHT ICA DIST SYS: 98.4 CM/S
RIGHT ICA MID DIAS: 16.5 CM/S
RIGHT ICA MID SYS: 43.4 CM/S
RIGHT ICA PROX DIAS: 14.2 CM/S
RIGHT ICA PROX SYS: 42.9 CM/S
RIGHT ICA/CCA SYS: 1.9
RIGHT SUBCLAVIAN DIAS: 0 CM/S
RIGHT SUBCLAVIAN SYS: 70.5 CM/S
RIGHT VERTEBRAL DIAS: 16.3 CM/S
RIGHT VERTEBRAL SYS: 51 CM/S

## 2021-02-15 ENCOUNTER — OFFICE VISIT (OUTPATIENT)
Dept: NEUROLOGY | Age: 81
End: 2021-02-15
Payer: MEDICARE

## 2021-02-15 VITALS
WEIGHT: 111 LBS | BODY MASS INDEX: 20.43 KG/M2 | HEIGHT: 62 IN | HEART RATE: 90 BPM | RESPIRATION RATE: 12 BRPM | TEMPERATURE: 97.4 F | SYSTOLIC BLOOD PRESSURE: 145 MMHG | OXYGEN SATURATION: 100 % | DIASTOLIC BLOOD PRESSURE: 71 MMHG

## 2021-02-15 DIAGNOSIS — R41.3 DISTURBANCE OF MEMORY: ICD-10-CM

## 2021-02-15 DIAGNOSIS — M48.02 DEGENERATIVE CERVICAL SPINAL STENOSIS: ICD-10-CM

## 2021-02-15 DIAGNOSIS — E03.4 HYPOTHYROIDISM DUE TO ACQUIRED ATROPHY OF THYROID: ICD-10-CM

## 2021-02-15 DIAGNOSIS — F03.90 DEMENTIA WITHOUT BEHAVIORAL DISTURBANCE, UNSPECIFIED DEMENTIA TYPE: Primary | ICD-10-CM

## 2021-02-15 DIAGNOSIS — F02.80 PRIMARY PROGRESSIVE APHASIA (HCC): ICD-10-CM

## 2021-02-15 DIAGNOSIS — I65.23 BILATERAL CAROTID ARTERY STENOSIS: ICD-10-CM

## 2021-02-15 DIAGNOSIS — E53.8 B12 DEFICIENCY: ICD-10-CM

## 2021-02-15 DIAGNOSIS — I63.311 THROMBOTIC STROKE INVOLVING RIGHT MIDDLE CEREBRAL ARTERY (HCC): ICD-10-CM

## 2021-02-15 DIAGNOSIS — G31.01 PRIMARY PROGRESSIVE APHASIA (HCC): ICD-10-CM

## 2021-02-15 PROCEDURE — G8427 DOCREV CUR MEDS BY ELIG CLIN: HCPCS | Performed by: PSYCHIATRY & NEUROLOGY

## 2021-02-15 PROCEDURE — G8400 PT W/DXA NO RESULTS DOC: HCPCS | Performed by: PSYCHIATRY & NEUROLOGY

## 2021-02-15 PROCEDURE — 99215 OFFICE O/P EST HI 40 MIN: CPT | Performed by: PSYCHIATRY & NEUROLOGY

## 2021-02-15 PROCEDURE — 1101F PT FALLS ASSESS-DOCD LE1/YR: CPT | Performed by: PSYCHIATRY & NEUROLOGY

## 2021-02-15 PROCEDURE — G8536 NO DOC ELDER MAL SCRN: HCPCS | Performed by: PSYCHIATRY & NEUROLOGY

## 2021-02-15 PROCEDURE — G8420 CALC BMI NORM PARAMETERS: HCPCS | Performed by: PSYCHIATRY & NEUROLOGY

## 2021-02-15 PROCEDURE — G8510 SCR DEP NEG, NO PLAN REQD: HCPCS | Performed by: PSYCHIATRY & NEUROLOGY

## 2021-02-15 PROCEDURE — 1090F PRES/ABSN URINE INCON ASSESS: CPT | Performed by: PSYCHIATRY & NEUROLOGY

## 2021-02-15 RX ORDER — GUAIFENESIN 100 MG/5ML
81 LIQUID (ML) ORAL DAILY
COMMUNITY
End: 2021-05-17 | Stop reason: ALTCHOICE

## 2021-02-15 RX ORDER — GLUCOSAMINE SULFATE 1500 MG
POWDER IN PACKET (EA) ORAL DAILY
COMMUNITY

## 2021-02-15 NOTE — LETTER
2/15/2021 Patient: Sanna Keller YOB: 1940 Date of Visit: 2/15/2021 [address] [place] [address] [address] [employer] Dear Liborio Bansal MD, Thank you for referring Ms. Nain Holt to [address] [place] [address] for evaluation. My notes for this consultation are attached. Consult REFERRED BY: 
Pauline Mcdonough MD 
 
CHIEF COMPLAINT: Left hand weakness and numbness for the last 4 weeks Subjective:  
 
Sanna Keller is a [de-identified] y.o. right-handed  female we are seeing as a new patient to me, for evaluation of a brand-new problem, of the patient having sudden left hand weakness and numbness and coordination problems and possibly even left leg weakness and that she tends to limp more on her left leg and slightly off balance and has to use a cane for ambulation. The patient was seen at [facility] emergency room and you can and told her she probably had a stroke, and was placed on a full dose aspirin and because it was a week ago by the time she got there she was not admitted apparently. She on February 4 had an MRI scan at [facility] that was completely normal and I reviewed that personally myself, and do agree that it shows no lesions, suggesting a stroke that I could see. She also had no atrophy of the brain and no atrophy particularly in the left frontal speech areas. She had a carotid Doppler study that showed less than 50% disease bilaterally, no major stenosis. The patient denied any chest pain palpitations or cardiac symptoms associated with this event, denied any fever trauma or other disturbing causes, and was referred to us for further evaluation. She thinks she has had her cholesterol checked but not sure, and is not taking a statin now. Her only other medications include vitamin D and a multivitamin.   She has no facial asymmetry on exam, and is generally hyperreflexic, but worse on the left side as compared to the right, but denies any neck pain or radicular pain. Her bowel and bladder function is okay and she denies any lower back pain. 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 She never had neuropsych testing. She has no family history of similar problems. No past medical history on file. No past surgical history on file. Family History Problem Relation Age of Onset  Diabetes Mother  Kidney Disease Mother  Stroke Father  Diabetes Sister  Diabetes Maternal Aunt  Heart Disease Maternal Uncle  Stroke Maternal Uncle  Diabetes Maternal Uncle  Parkinsonism Paternal Grandmother  Heart Disease Paternal Grandfather  Heart Disease Paternal Uncle  Diabetes Paternal Uncle Social History Tobacco Use  Smoking status: Never Smoker  Smokeless tobacco: Current User Substance Use Topics  Alcohol use: No  
   
 
Current Outpatient Medications:  
  cholecalciferol (Vitamin D3) 25 mcg (1,000 unit) cap, Take  by mouth daily. , Disp: , Rfl:  
  aspirin 81 mg chewable tablet, Take 81 mg by mouth daily. , Disp: , Rfl:  
  multivitamin, tx-iron-ca-min (THERA-M w/ IRON) 9 mg iron-400 mcg tab tablet, Take 1 Tab by mouth daily. , Disp: , Rfl:  
 
 
 
No Known Allergies MRI Results (most recent): 
Results from Hospital Encounter encounter on 02/05/21 MRI BRAIN WO CONT Narrative INDICATION: Weakness EXAMINATION:  MRI BRAIN WO CONTRAST 
 
COMPARISON:  5/10/2018 TECHNIQUE:  Multiplanar multisequence acquisition without contrast of the brain. FINDINGS:   
 
Ventricles:  Midline, no hydrocephalus. Brain Parenchyma/Brainstem:  Normal for age. No acute infarction. Intracranial Hemorrhage:  None. Basal Cisterns:  Normal.  
Flow Voids:  Normal. 
Additional Comments:  N/A. Impression No significant abnormality or acute process. Results from [facility] En[facility] encounter on 02/05/21 MRI BRAIN WO CONT Narrative INDICATION: Weakness EXAMINATION:  MRI BRAIN WO CONTRAST 
 
COMPARISON:  5/10/2018 TECHNIQUE:  Multiplanar multisequence acquisition without contrast of the brain. FINDINGS:   
 
Ventricles:  Midline, no hydrocephalus. Brain Parenchyma/Brainstem:  Normal for age. No acute infarction. Intracranial Hemorrhage:  None. Basal Cisterns:  Normal.  
Flow Voids:  Normal. 
Additional Comments:  N/A. Impression No significant abnormality or acute process. Review of Systems: A comprehensive review of systems was negative except for: Musculoskeletal: positive for myalgias, arthralgias, stiff joints and muscle weakness Neurological: positive for memory problems, speech problems, paresthesia, coordination problems, gait problems and weakness Behvioral/Psych: positive for anxiety and depression Vitals:  
 02/15/21 0958 BP: (!) 145/71 Pulse: 90 Resp: 12 Temp: 97.4 °F (36.3 °C) SpO2: 100% Weight: 111 lb (50.3 kg) Height: 5' 2\" (1.575 m) Objective: I 
 
 
NEUROLOGICAL EXAM: 
 
Appearance: The patient is thinly developed andl nourished, provides a fair history and is in no acute distress. Mental Status: Oriented to time, place and person, and the president, cognitive function is slow and mildly abnormal and speech is fluent and questionable mild aphasia but no dysarthria. Mood and affect appropriate. Cranial Nerves:   Intact visual fields. Fundi are benign, disc are flat, no lesions seen on funduscopy. LISSA, EOM's full, no nystagmus, no ptosis. Facial sensation is normal. Corneal reflexes are not tested. Facial movement is symmetric. Hearing is abnormal bilaterally.  Palate is midline with normal sternocleidomastoid and trapezius muscles are normal. Tongue is midline. Neck without meningismus or bruits Temporal arteries are not tender or enlarged TMJ areas are not tender on palpation Motor:  4/5 strength in upper and lower proximal and distal muscles on the right, and about 3/5 strength on the left, with left arm being weaker than left leg. . Normal bulk and tone. No fasciculations. Rapid alternating movement is symmetric and slow on the left foot and hand Reflexes:   Deep tendon reflexes 2+/4 on the right and 3+/4 on the left No babinski or clonus present Sensory:   Normal to touch, pinprick and vibration and temperature. DSS is intact Gait:  Abnormal gait with patient having a somewhat unsteady gait, with a clear left spastic hemiparetic gait. Tremor:   No tremor noted. Cerebellar:   Mildly abnormal cerebellar signs present on Romberg and tandem testing and finger-nose-finger exam.  
Neurovascular:  Normal heart sounds and regular rhythm, peripheral pulses decreased and no carotid bruits. Assessment: ICD-10-CM ICD-9-CM 1. Dementia without behavioral disturbance, unspecified dementia type ([facility]  F03.90 294.20 REFERRAL TO PHYSICAL THERAPY REFERRAL TO NEUROPSYCHOLOGY  
   MRI CERV SPINE WO CONT  
   MRA BRAIN WO CONT  
   VITAMIN B12 & FOLATE  
   TSH 3RD GENERATION  
   SED RATE (ESR) DAYNA COMPREHENSIVE PLUS PANEL 2. Bilateral carotid artery stenosis  I65.23 433.10 REFERRAL TO PHYSICAL THERAPY  
  433.30 REFERRAL TO NEUROPSYCHOLOGY  
   MRI CERV SPINE WO CONT  
   MRA BRAIN WO CONT  
   VITAMIN B12 & FOLATE  
   TSH 3RD GENERATION  
   SED RATE (ESR) DAYNA COMPREHENSIVE PLUS PANEL 3. Thrombotic stroke involving right middle cerebral artery (HCC)  I63.311 434.01 REFERRAL TO PHYSICAL THERAPY REFERRAL TO NEUROPSYCHOLOGY  
   MRI CERV SPINE WO CONT  
   MRA BRAIN WO CONT  
   VITAMIN B12 & FOLATE  
   TSH 3RD GENERATION  
   SED RATE (ESR)    DAYNA COMPREHENSIVE PLUS PANEL 4. Primary progressive aphasia ([facility]  G31.01 784.3 REFERRAL TO PHYSICAL THERAPY F02.80  REFERRAL TO NEUROPSYCHOLOGY  
   MRI CERV SPINE WO CONT  
   MRA BRAIN WO CONT  
   VITAMIN B12 & FOLATE  
   TSH 3RD GENERATION  
   SED RATE (ESR) DAYNA COMPREHENSIVE PLUS PANEL 5. B12 deficiency  E53.8 266.2 REFERRAL TO PHYSICAL THERAPY REFERRAL TO NEUROPSYCHOLOGY  
   MRI CERV SPINE WO CONT  
   MRA BRAIN WO CONT  
   VITAMIN B12 & FOLATE  
   TSH 3RD GENERATION  
   SED RATE (ESR) DAYNA COMPREHENSIVE PLUS PANEL 6. Hypothyroidism due to acquired atrophy of thyroid  E03.4 244.8 REFERRAL TO PHYSICAL THERAPY  
  246.8 REFERRAL TO NEUROPSYCHOLOGY  
   MRI CERV SPINE WO CONT  
   MRA BRAIN WO CONT  
   VITAMIN B12 & FOLATE  
   TSH 3RD GENERATION  
   SED RATE (ESR) DAYNA COMPREHENSIVE PLUS PANEL 7. Degenerative cervical spinal stenosis  M48.02 723.0 REFERRAL TO PHYSICAL THERAPY REFERRAL TO NEUROPSYCHOLOGY  
   MRI CERV SPINE WO CONT  
   MRA BRAIN WO CONT  
   VITAMIN B12 & FOLATE  
   TSH 3RD GENERATION  
   SED RATE (ESR) DAYNA COMPREHENSIVE PLUS PANEL 8. Disturbance of memory  R41.3 780.93 REFERRAL TO PHYSICAL THERAPY REFERRAL TO NEUROPSYCHOLOGY  
   MRI CERV SPINE WO CONT  
   MRA BRAIN WO CONT  
   VITAMIN B12 & FOLATE  
   TSH 3RD GENERATION  
   SED RATE (ESR) DAYNA COMPREHENSIVE PLUS PANEL Active Problems: * No active hospital problems. * 
 
 
Plan:  
 
Patient presents with a confusing history of left-sided weakness that certainly looks like a stroke, so that she has no facial involvement but clearly is diffusely hyperreflexic left side greater than right, which raises the question could she possibly have a cervical myelopathy so we will get an MRI of the cervical spine to rule out spinal stenosis as a cause of her gait ataxia, spastic quadriparesis, and new left-sided weakness.  
Patient also has a unusual history of speech difficulty, but actually was fairly fluent today, with only occasional word searching, and 1 wonders that she had a primary dementia versus primary progressive aphasia, but her MRI scan does not suggest any left frontal atrophic changes consistent with PPA, not much cerebral atrophy either. We discussed her condition with the patient and her son in detail, and obviously she is high risk for further stroke neurologic deficit and dysfunction she has either a degenerative brain condition or spinal stenosis that goes untreated, or is having strokes that may be progressive. We will also get an MRI of the brain to rule out intracranial stenosis as a cause of her some of her symptoms. She will get the neuropsych testing in addition to evaluate both her speech and memory. They will check my chart for results of her test, and call us if there is any problem in the interim. Very difficult case, 45 minutes met with the patient and her son, and she is advised to continue the aspirin, and check with her PCP to see if they have had cholesterol levels checked because she may need a statin if it looks like this is more vascular. Metabolic studies also done to rule out any cause of her dementia like B12 levels, and connective tissue screen done to rule out arteritis or vasculitis as a cause of her symptoms also. Signed By: Yaima Junior MD   
 February 15, 2021 CC: Last Martinez MD 
FAX: [phone] If you have questions, please do not hesitate to call me. I look forward to following your patient along with you. Sincerely, Yaima Junior MD

## 2021-02-16 ENCOUNTER — TELEPHONE (OUTPATIENT)
Dept: NEUROLOGY | Age: 81
End: 2021-02-16

## 2021-02-16 NOTE — PROGRESS NOTES
Consult  REFERRED BY:  Latasha Velásquez MD    CHIEF COMPLAINT: Left hand weakness and numbness for the last 4 weeks      Subjective:     Tasha Gonzalez is a [de-identified] y.o. right-handed  female we are seeing as a new patient to me, for evaluation of a brand-new problem, of the patient having sudden left hand weakness and numbness and coordination problems and possibly even left leg weakness and that she tends to limp more on her left leg and slightly off balance and has to use a cane for ambulation. The patient was seen at [facility] emergency room and you can and told her she probably had a stroke, and was placed on a full dose aspirin and because it was a week ago by the time she got there she was not admitted apparently. She on February 4 had an MRI scan at [facility] that was completely normal and I reviewed that personally myself, and do agree that it shows no lesions, suggesting a stroke that I could see. She also had no atrophy of the brain and no atrophy particularly in the left frontal speech areas. She had a carotid Doppler study that showed less than 50% disease bilaterally, no major stenosis. The patient denied any chest pain palpitations or cardiac symptoms associated with this event, denied any fever trauma or other disturbing causes, and was referred to us for further evaluation. She thinks she has had her cholesterol checked but not sure, and is not taking a statin now. Her only other medications include vitamin D and a multivitamin. She has no facial asymmetry on exam, and is generally hyperreflexic, but worse on the left side as compared to the right, but denies any neck pain or radicular pain. Her bowel and bladder function is okay and she denies any lower back pain.   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 She never had neuropsych testing. She has no family history of similar problems. No past medical history on file. No past surgical history on file. Family History   Problem Relation Age of Onset    Diabetes Mother     Kidney Disease Mother     Stroke Father     Diabetes Sister     Diabetes Maternal Aunt     Heart Disease Maternal Uncle     Stroke Maternal Uncle     Diabetes Maternal Uncle     Parkinsonism Paternal Grandmother     Heart Disease Paternal Grandfather     Heart Disease Paternal Uncle     Diabetes Paternal Uncle       Social History     Tobacco Use    Smoking status: Never Smoker    Smokeless tobacco: Current User   Substance Use Topics    Alcohol use: No         Current Outpatient Medications:     cholecalciferol (Vitamin D3) 25 mcg (1,000 unit) cap, Take  by mouth daily. , Disp: , Rfl:     aspirin 81 mg chewable tablet, Take 81 mg by mouth daily. , Disp: , Rfl:     multivitamin, tx-iron-ca-min (THERA-M w/ IRON) 9 mg iron-400 mcg tab tablet, Take 1 Tab by mouth daily. , Disp: , Rfl:         No Known Allergies   MRI Results (most recent):  Results from Hospital Encounter encounter on 02/05/21   MRI BRAIN WO CONT    Narrative INDICATION: Weakness    EXAMINATION:  MRI BRAIN WO CONTRAST    COMPARISON:  5/10/2018    TECHNIQUE:  Multiplanar multisequence acquisition without contrast of the brain. FINDINGS:      Ventricles:  Midline, no hydrocephalus. Brain Parenchyma/Brainstem:  Normal for age. No acute infarction. Intracranial Hemorrhage:  None. Basal Cisterns:  Normal.   Flow Voids:  Normal.  Additional Comments:  N/A. Impression No significant abnormality or acute process.          Results from [place] encounter on 02/05/21   MRI BRAIN WO CONT    Narrative INDICATION: Weakness    EXAMINATION:  MRI BRAIN WO CONTRAST    COMPARISON:  5/10/2018    TECHNIQUE:  Multiplanar multisequence acquisition without contrast of the brain. FINDINGS:      Ventricles:  Midline, no hydrocephalus. Brain Parenchyma/Brainstem:  Normal for age. No acute infarction. Intracranial Hemorrhage:  None. Basal Cisterns:  Normal.   Flow Voids:  Normal.  Additional Comments:  N/A. Impression No significant abnormality or acute process. Review of Systems:  A comprehensive review of systems was negative except for: Musculoskeletal: positive for myalgias, arthralgias, stiff joints and muscle weakness  Neurological: positive for memory problems, speech problems, paresthesia, coordination problems, gait problems and weakness  Behvioral/Psych: positive for anxiety and depression   Vitals:    02/15/21 0837   BP: (!) 145/71   Pulse: 90   Resp: 12   Temp: 97.4 °F (36.3 °C)   SpO2: 100%   Weight: 111 lb (50.3 kg)   Height: 5' 2\" (1.575 m)     Objective:     I      NEUROLOGICAL EXAM:    Appearance: The patient is thinly developed andl nourished, provides a fair history and is in no acute distress. Mental Status: Oriented to time, place and person, and the president, cognitive function is slow and mildly abnormal and speech is fluent and questionable mild aphasia but no dysarthria. Mood and affect appropriate. Cranial Nerves:   Intact visual fields. Fundi are benign, disc are flat, no lesions seen on funduscopy. LISSA, EOM's full, no nystagmus, no ptosis. Facial sensation is normal. Corneal reflexes are not tested. Facial movement is symmetric. Hearing is abnormal bilaterally. Palate is midline with normal sternocleidomastoid and trapezius muscles are normal. Tongue is midline.   Neck without meningismus or bruits  Temporal arteries are not tender or enlarged  TMJ areas are not tender on palpation   Motor:  4/5 strength in upper and lower proximal and distal muscles on the right, and about 3/5 strength on the left, with left arm being weaker than left leg. . Normal bulk and tone. No fasciculations. Rapid alternating movement is symmetric and slow on the left foot and hand   Reflexes:   Deep tendon reflexes 2+/4 on the right and 3+/4 on the left  No babinski or clonus present   Sensory:   Normal to touch, pinprick and vibration and temperature. DSS is intact   Gait:  Abnormal gait with patient having a somewhat unsteady gait, with a clear left spastic hemiparetic gait. Tremor:   No tremor noted. Cerebellar:   Mildly abnormal cerebellar signs present on Romberg and tandem testing and finger-nose-finger exam.   Neurovascular:  Normal heart sounds and regular rhythm, peripheral pulses decreased and no carotid bruits. Assessment:       ICD-10-CM ICD-9-CM    1. Dementia without behavioral disturbance, unspecified dementia type ([facility])  F03.90 294.20 REFERRAL TO PHYSICAL THERAPY      REFERRAL TO NEUROPSYCHOLOGY      MRI CERV SPINE WO CONT      MRA BRAIN WO CONT      VITAMIN B12 & FOLATE      TSH 3RD GENERATION      SED RATE (ESR)      DAYNA COMPREHENSIVE PLUS PANEL   2. Bilateral carotid artery stenosis  I65.23 433.10 REFERRAL TO PHYSICAL THERAPY     433.30 REFERRAL TO NEUROPSYCHOLOGY      MRI CERV SPINE WO CONT      MRA BRAIN WO CONT      VITAMIN B12 & FOLATE      TSH 3RD GENERATION      SED RATE (ESR)      DAYNA COMPREHENSIVE PLUS PANEL   3. Thrombotic stroke involving right middle cerebral artery ([facility])  I63.311 434.01 REFERRAL TO PHYSICAL THERAPY      REFERRAL TO NEUROPSYCHOLOGY      MRI CERV SPINE WO CONT      MRA BRAIN WO CONT      VITAMIN B12 & FOLATE      TSH 3RD GENERATION      SED RATE (ESR)      DAYNA COMPREHENSIVE PLUS PANEL   4. Primary progressive aphasia ([facility])  G31.01 784.3 REFERRAL TO PHYSICAL THERAPY    F02.80  REFERRAL TO NEUROPSYCHOLOGY      MRI CERV SPINE WO CONT      MRA BRAIN WO CONT      VITAMIN B12 & FOLATE      TSH 3RD GENERATION      SED RATE (ESR)      DAYNA COMPREHENSIVE PLUS PANEL   5.  B12 deficiency  E53.8 266.2 REFERRAL TO PHYSICAL THERAPY      REFERRAL TO NEUROPSYCHOLOGY      MRI CERV SPINE WO CONT      MRA BRAIN WO CONT      VITAMIN B12 & FOLATE      TSH 3RD GENERATION      SED RATE (ESR)      DAYNA COMPREHENSIVE PLUS PANEL   6. Hypothyroidism due to acquired atrophy of thyroid  E03.4 244.8 REFERRAL TO PHYSICAL THERAPY     246.8 REFERRAL TO NEUROPSYCHOLOGY      MRI CERV SPINE WO CONT      MRA BRAIN WO CONT      VITAMIN B12 & FOLATE      TSH 3RD GENERATION      SED RATE (ESR)      DAYNA COMPREHENSIVE PLUS PANEL   7. Degenerative cervical spinal stenosis  M48.02 723.0 REFERRAL TO PHYSICAL THERAPY      REFERRAL TO NEUROPSYCHOLOGY      MRI CERV SPINE WO CONT      MRA BRAIN WO CONT      VITAMIN B12 & FOLATE      TSH 3RD GENERATION      SED RATE (ESR)      DAYNA COMPREHENSIVE PLUS PANEL   8. Disturbance of memory  R41.3 780.93 REFERRAL TO PHYSICAL THERAPY      REFERRAL TO NEUROPSYCHOLOGY      MRI CERV SPINE WO CONT      MRA BRAIN WO CONT      VITAMIN B12 & FOLATE      TSH 3RD GENERATION      SED RATE (ESR)      DAYNA COMPREHENSIVE PLUS PANEL     Active Problems:    * No active hospital problems. *      Plan:     Patient presents with a confusing history of left-sided weakness that certainly looks like a stroke, so that she has no facial involvement but clearly is diffusely hyperreflexic left side greater than right, which raises the question could she possibly have a cervical myelopathy so we will get an MRI of the cervical spine to rule out spinal stenosis as a cause of her gait ataxia, spastic quadriparesis, and new left-sided weakness. Patient also has a unusual history of speech difficulty, but actually was fairly fluent today, with only occasional word searching, and 1 wonders that she had a primary dementia versus primary progressive aphasia, but her MRI scan does not suggest any left frontal atrophic changes consistent with PPA, not much cerebral atrophy either.   We discussed her condition with the patient and her son in detail, and obviously she is high risk for further stroke neurologic deficit and dysfunction she has either a degenerative brain condition or spinal stenosis that goes untreated, or is having strokes that may be progressive. We will also get an MRI of the brain to rule out intracranial stenosis as a cause of her some of her symptoms. She will get the neuropsych testing in addition to evaluate both her speech and memory. They will check my chart for results of her test, and call us if there is any problem in the interim. Very difficult case, 45 minutes met with the patient and her son, and she is advised to continue the aspirin, and check with her PCP to see if they have had cholesterol levels checked because she may need a statin if it looks like this is more vascular. Metabolic studies also done to rule out any cause of her dementia like B12 levels, and connective tissue screen done to rule out arteritis or vasculitis as a cause of her symptoms also.     Signed By: Teodoro Constantino MD     February 15, 2021       CC: Pauline Mcdonough MD  FAX: [phone]

## 2021-02-16 NOTE — TELEPHONE ENCOUNTER
----- Message from [place] sent at 2/16/2021  1:17 PM EST -----  Regarding: Dr Chavez/telephone  General Message/Vendor Calls    Caller's first and last name:      Reason for call:pt was referred by Dr Consuelo Dawkins and needs to schedule an appt with Dr Emperatriz Sawant, for eval to r/o  alzheimer's  vs  primary aggressive aphasia      Callback required yes/no and why:yes for reason given above       Best contact number(s):[phone]      Details to clarify the request:notes and labs in [facility]

## 2021-02-26 ENCOUNTER — HOSPITAL ENCOUNTER (OUTPATIENT)
Dept: MRI IMAGING | Age: 81
Discharge: HOME | End: 2021-02-26
Attending: PSYCHIATRY & NEUROLOGY
Payer: MEDICARE

## 2021-02-26 DIAGNOSIS — I65.23 BILATERAL CAROTID ARTERY STENOSIS: ICD-10-CM

## 2021-02-26 DIAGNOSIS — I63.311 THROMBOTIC STROKE INVOLVING RIGHT MIDDLE CEREBRAL ARTERY (HCC): ICD-10-CM

## 2021-02-26 DIAGNOSIS — F03.90 DEMENTIA WITHOUT BEHAVIORAL DISTURBANCE, UNSPECIFIED DEMENTIA TYPE: ICD-10-CM

## 2021-02-26 DIAGNOSIS — E53.8 B12 DEFICIENCY: ICD-10-CM

## 2021-02-26 DIAGNOSIS — R41.3 DISTURBANCE OF MEMORY: ICD-10-CM

## 2021-02-26 DIAGNOSIS — E03.4 HYPOTHYROIDISM DUE TO ACQUIRED ATROPHY OF THYROID: ICD-10-CM

## 2021-02-26 DIAGNOSIS — F02.80 PRIMARY PROGRESSIVE APHASIA (HCC): ICD-10-CM

## 2021-02-26 DIAGNOSIS — M48.02 DEGENERATIVE CERVICAL SPINAL STENOSIS: ICD-10-CM

## 2021-02-26 DIAGNOSIS — G31.01 PRIMARY PROGRESSIVE APHASIA (HCC): ICD-10-CM

## 2021-02-26 PROCEDURE — 72141 MRI NECK SPINE W/O DYE: CPT

## 2021-02-26 PROCEDURE — 70544 MR ANGIOGRAPHY HEAD W/O DYE: CPT

## 2021-02-27 ENCOUNTER — DOCUMENTATION ONLY (OUTPATIENT)
Dept: NEUROLOGY | Age: 81
End: 2021-02-27

## 2021-02-27 NOTE — PROGRESS NOTES
I called the patient, advised her that the MRA scan of the brain did not show any aneurysm or vascular malformation.   Her MRI of the cervical spine showed moderate severe degenerative changes but no major cord compression or clear surgical lesion, I will continue conservative management and advised her that also she said good and she was relieved  She will call back if any questions

## 2021-03-03 ENCOUNTER — HOSPITAL ENCOUNTER (OUTPATIENT)
Dept: PHYSICAL THERAPY | Age: 81
Discharge: HOME | End: 2021-03-03
Payer: MEDICARE

## 2021-03-03 PROCEDURE — 97110 THERAPEUTIC EXERCISES: CPT

## 2021-03-03 PROCEDURE — 97162 PT EVAL MOD COMPLEX 30 MIN: CPT

## 2021-03-03 NOTE — PROGRESS NOTES
[facility] [address]. Physical Therapy  [address] ([address] IV), [address] [facility], [address] [place] [address]  Phone: [phone] Fax: [phone]     Plan of Care/Statement of Necessity for Physical Therapy Services  2-15    Patient name: Vishal Malloy  : 1940  Provider#: [id number]  Referral source: Miriam Browne MD      Medical/Treatment Diagnosis: Muscle weakness (generalized) [M62.81]     Prior Hospitalization: see medical history     Comorbidities: Thyroid dysfunction, hearing impairment, history of R CVA  Prior Level of Function: Independent, active  Medications: Verified on Patient Summary List  Start of Care: 21      Onset Date: 21   The Plan of Care and following information is based on the information from the initial evaluation. Assessment/ key information:     The patient is an [de-identified]year old female who presents to physical therapy services due to functional limitations related to potential CVA with L-sided weakness. She demonstrates decreased L UE/LE strength ( strength: L = 34 lbs., R = 8.5 lbs.), impaired fine motor coordination, and postural control deficits during static stance and dynamic walking tasks. She scored 46/56 on FITZPATRICK Balance Scale and 18.05 seconds on Timed Up and Go today, demonstrating moderate fall risk and decreased community ambulation potential. She would benefit from continued skilled physical therapy services to increase strength, endurance, independence, and safety with functional tasks such as typing and ambulating. Evaluation Complexity History HIGH Complexity :3+ comorbidities / personal factors will impact the outcome/ POC ; Examination HIGH Complexity : 4+ Standardized tests and measures addressing body structure, function, activity limitation and / or participation in recreation  ;Presentation MEDIUM Complexity : Evolving with changing characteristics  ; Clinical Decision Making MEDIUM Complexity : FOTO score of 26-74  Overall Complexity Rating: MEDIUM    Problem List: decrease ROM, decrease strength, impaired gait/ balance, decrease ADL/ functional abilitiies, decrease activity tolerance, decrease flexibility/ joint mobility and decrease transfer abilities   Treatment Plan may include any combination of the following: Therapeutic exercise, Therapeutic activities, Neuromuscular re-education, Physical agent/modality, Gait/balance training, Manual therapy, Patient education, Self Care training, Functional mobility training, Home safety training and Stair training  Patient / Family readiness to learn indicated by: asking questions, trying to perform skills and interest  Persons(s) to be included in education: patient (P) and family support person (FSP) darryl Murcia  Barriers to Learning/Limitations: yes;  sensory deficits-vision/hearing/speech  Patient Goal (s): Get better use of my fingers, arm, and leg! \"  Patient Self Reported Health Status: good  Rehabilitation Potential: good    Short Term Goals: To be accomplished in 6-8 treatments: The patient will demonstrate independence with updated and progressive HEP to demonstrate increased participation with PT plan of care. The patient will perform Timed Up and Go in <= 13 seconds without assistive device (average of two trials) to demonstrate improved community ambulation potential.  Long Term Goals: To be accomplished in 12-16 treatments: The patient will demonstrate L  strength increased to >= 20 lbs. (average of two trials) to demonstrate improved ability to , lift, and type. The patient will demonstrate L UE/LE strength increased by >= 1/2 MMT grade toward improved endurance and safety with functional tasks. The patient will score >= 60 on UE FOTO to demonstrate significantly improved subjective report of function with typing. Frequency / Duration: Patient to be seen 2 times per week for 12-16 treatments.     Patient/ Caregiver education and instruction: self care, activity modification and exercises    [x]  Plan of care has been reviewed with PTA    Certification Period: 03/03/21-06/01/21    Pauline Major PT, DPT 3/3/2021     ________________________________________________________________________    I certify that the above Therapy Services are being furnished while the patient is under my care. I agree with the treatment plan and certify that this therapy is necessary.    Physician's Signature:____________________  Date:____________Time: _________         Jorge Rayo MD

## 2021-03-03 NOTE — PROGRESS NOTES
PT INITIAL EVALUATION NOTE - [facility] 2-15    Patient Name: Lazaro Trinidad  Date:3/3/2021  : 1940  [x]  Patient  Verified  [place] / Plan: [place] PART [place] & B / Product Type: Medicare /    In time:   Out time:   Total Treatment Time (min): 66  Total Timed Codes (min): 15  1:1 Treatment Time ( only): 15   Visit #: 1     Treatment Area: Muscle weakness (generalized) [M62.81]    SUBJECTIVE  Pain Level (0-10 scale): 0  Any medication changes, allergies to medications, adverse drug reactions, diagnosis change, or new procedure performed?: [] No    [x] Yes (see summary sheet for update)  Subjective:      *The patient was accompanied by her son Federico Carcamo) to evaluation at this date*    The patient reports she had a stroke (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 Of note, she is a  and is most limited by inability to type with L hand due to difficulty isolating each finger and gripping. She notes onset of L hand \"heaviness\" about a week ago, which only lasted a few hours. She notes she walks every day outside between 1/4 and 1/2 mile with her son, using Hurrycane due to fear of falling over uneven surfaces. She notes two falls since stroke, once when she stood up from a chair and lost balance to L, the other when she was reaching down for a package outdoors and fell backward into the front door of her house. Current functional limitations include: rising from chairs, entering/exiting car, getting seat belt off, dressing, and typing. Goals: \"get better use of my fingers, arm, and leg! \".     OBJECTIVE/EXAMINATION    Vitals: /91 HR 83 at arrival  Posture: Noted increased thoracic kyphosis, forward head posture in seated position  Other Observations: Patient requires increased time to complete transfers throughout evaluation, able to complete without assistive device over even surfaces  Gait and Functional Mobility: Patient uses Hurrycane in R UE to ambulate, demonstrates decreased tyron, decreased stance time on L, mild L knee flexion, decreased L hip extension during stance phase, maintains L UE in flexed/adducted/IR position    LOWER QUARTER   MUSCLE STRENGTH  KEY       R  L  0 - No Contraction  Knee ext  4+  4+  1 - Trace            flex  4+  4  2 - Poor   Hip ext   4  3+  3 - Fair          flex   4  4  4 - Good         abd  5  4-  5 - Normal         add  4  4      Ankle DF  4+  4                PF  4  4    UPPER QUARTER   MUSCLE STRENGTH  KEY       R  L  0 - No Contraction   Flexion  5  4  1 - Trace      2 - Poor    Abduction 5  4  3 - Fair     IR             5  5  4 - Good    ER  5  4+  5 - Normal                                     Thumb Abd      4                     3+                                                              Finger Abd       4                      2        Strength:     R = 33, 35 lbs; average 34 lbs     L = 7, 10 lbs; average 8.5 lbs     Neurological: Reflexes / Sensations: Dermatomes WNL fpr light touch throughout bilateral UEs/LEs. UE/LE reflexes 2+ throughout on R, 3+ throughout on L. Heel to maradiaga test (-) on L. (-) clonus at L ankle.  (+) L hamstring spasticity (1 on Markos scale)    FITZPATRICK Balance Scale: 46/56  Timed Up and Go (no assistive device): 17.9 seconds, 18.2 seconds; average = 18.05 seconds    15 min Therapeutic Exercise:  [x] See flow sheet :   Rationale: increase ROM, increase strength, improve coordination, improve balance and increase proprioception to improve the patients ability to type and perform ADLs          With   [x] TE   [] TA   [] Neuro   [] SC   [] other: Patient Education: [x] Review HEP    [] Progressed/Changed HEP based on:   [] positioning   [] body mechanics   [] transfers   [] heat/ice application    [] other:      Other Objective/Functional Measures: FOTO = 48 (UE); 50 (balance and gait)    Pain Level (0-10 scale) post treatment: 0    ASSESSMENT/Changes in Function:     [x]  See Plan of [address]lejas, PT, DPT 3/3/2021

## 2021-03-04 ENCOUNTER — OFFICE VISIT (OUTPATIENT)
Dept: NEUROLOGY | Age: 81
End: 2021-03-04
Payer: MEDICARE

## 2021-03-04 DIAGNOSIS — R41.3 MEMORY LOSS OF UNKNOWN CAUSE: Primary | ICD-10-CM

## 2021-03-04 PROCEDURE — 96116 NUBHVL XM PHYS/QHP 1ST HR: CPT | Performed by: PSYCHOLOGIST

## 2021-03-04 NOTE — PROGRESS NOTES
This note will not be viewable in [employer] [facility] for the following reason(s). Likely risk of substantial harm from the misinterpretation of the data generated by this evaluation. [facility] at [address]    Office:  [phone]  Fax: [phone]                 Initial Office Exam    Patient Name: Cris Rick  Age: [de-identified] y.o. Gender: female   Occupation:  Handedness: right handed   Presenting Concern: Memory loss,   Primary Care Physician: Eve Crenshaw MD  Referring Provider: Elizabeth Garmajo MD      REASON FOR REFERRAL:  This comprehensive and medically necessary neuropsychological assessment was requested to assist with a differential diagnosis of MCI. The use and purpose of this examination, as well as the extent and limitations of confidentiality, were explained prior to obtaining permission to participate. Instructions were provided regarding the necessity to put forth optimal effort and answer questions truthfully in order to obtain reliable and accurate test results. PERTINENT HISTORY:  Ms. Cyndy Estrada presented for a neuropsychological assessment at the recommendation of her treating physician secondary to complaints of memory loss and left hand constrictions. She has reported symptoms that include feeling slowed down. Ms. Cyndy Estrada began noticing symptoms of forgetting about a year ago. From a brief review of her medical and personal history there has not been any other significant neurological injury or illness noted or reported. No family history of neurological problems. She did not report experiencing depression or anxiety in the past.      Ms. Cyndy Estrada does not  report any problems at birth or difficulties meeting developmental milestones. She reports that she had an adequate level of family support and was not subject to trauma or abuse as a child.   Ms. Cyndy Estrada does not  report being retain in school or receiving special assistance in any of she classes or subjects. Ms. Diomedes Rendon completed 15 years of education. Ms. Diomedes Rendon does  exercise on a regular basis and does  maintain a balanced diet. She does not  report problems with sleep and does not  complain of pain. She does not  participate in mentally stimulating activities. Ms. Diomedes Rendon does not  have concerns regarding prescription medications, family members, place of residence, or financial stressors. Ms. Diomedes Rendon indicated that she is independent in her instrumental activities of daily living, including shopping, meal preparation, housekeeping, doing laundry, driving a car, managing medications, and finances. Current Outpatient Medications   Medication Sig    cholecalciferol (Vitamin D3) 25 mcg (1,000 unit) cap Take  by mouth daily.  aspirin 81 mg chewable tablet Take 81 mg by mouth daily.  multivitamin, tx-iron-ca-min (THERA-M w/ IRON) 9 mg iron-400 mcg tab tablet Take 1 Tab by mouth daily. No current facility-administered medications for this visit. No past medical history on file. No flowsheet data found. No data recorded    No past surgical history on file.     Social History     Socioeconomic History    Marital status:      Spouse name: Not on file    Number of children: Not on file    Years of education: Not on file    Highest education level: Not on file   Tobacco Use    Smoking status: Never Smoker    Smokeless tobacco: Current User   Substance and Sexual Activity    Alcohol use: No    Drug use: No    Sexual activity: Not Currently       Family History   Problem Relation Age of Onset    Diabetes Mother     Kidney Disease Mother     Stroke Father     Diabetes Sister     Heart Disease Sister         pacemaker    Diabetes Maternal Aunt     Heart Disease Maternal Uncle     Stroke Maternal Uncle     Diabetes Maternal Uncle     Parkinsonism Paternal Grandmother     Heart Disease Paternal Grandfather     Heart Disease Paternal Uncle     Diabetes Paternal Uncle        CT Results (most recent):  Results from Hospital Encounter encounter on 10/26/16   CT HEAD WO CONT    Narrative EXAM:  CT HEAD WO CONT    INDICATION:   left sided headache    COMPARISON: None. TECHNIQUE: Unenhanced CT of the head was performed using 5 mm images. Brain and  bone windows were generated. CT dose reduction was achieved through use of a  standardized protocol tailored for this examination and automatic exposure  control for dose modulation. FINDINGS:  The ventricles and sulci are normal in size, shape and configuration and  midline. There is no significant white matter disease. There is no intracranial  hemorrhage, extra-axial collection, mass, mass effect or midline shift. The  basilar cisterns are open. No acute infarct is identified. The bone windows  demonstrate no abnormalities. The visualized portions of the paranasal sinuses  and mastoid air cells are clear. Impression IMPRESSION: Normal CT scan of the head without contrast       MRI Results (most recent):  Results from Hospital Encounter encounter on 02/26/21   MRA BRAIN WO CONT    Narrative EXAM: MRA BRAIN WO CONT    INDICATION:   Dementia, memory disturbance. COMPARISON:  MRI brain 2/5/2021    CONTRAST:  None. TECHNIQUE:    3-D time-of-flight noncontrast MRA of the brain was performed. Multiplanar and  MIP reconstructions were obtained. FINDINGS:  There is no evidence of large vessel occlusion or flow-limiting stenosis of the  intracranial internal carotid, anterior cerebral, and middle cerebral arteries. The anterior communicating artery is patent. There is no evidence of large vessel occlusion or flow-limiting stenosis of the  intracranial vertebral arteries, basilar artery, or posterior cerebral arteries. The posterior communicating arteries are patent, right larger than left.      There is no evidence of aneurysm or vascular malformation. Impression 1. No evidence of significant stenosis or aneurysm. MENTAL STATUS:    Orientation:    Eye Contact:    Motor Behavior:     Speech: Thought Process: Thought Content:    Suicidal ideations:    Mood:     Affect:     Concentration:     Abstraction:     Insight:       On the Modified Mini-Mental Status Exam: 87/100 (WNL)      DIAGNOSTIC IMPRESSIONS:    ICD-10-CM ICD-9-CM    1. Memory loss of unknown cause  R41.3 780.93              PLAN:  1. Complete a comprehensive neuropsychological assessment to provide a differential diagnosis of presenting concerns as well as to assist with disposition and treatment planning as appropriate. 2. Consider compensatory and remedial cognitive training. 3. Consider an adaptive driving evaluation. 4. Consider referral for elder health nurse to provide an in-home functional assessment. 5. Consider placement issues to provide greater structure and supervision to ensure safety, health and well-being. [id number] x 1 Review of records. Face to face interview w/ patient. Determine test protocol: 60 minutes.  Total 1 unit        Jose Dowell, PhD, ABPP, LCP  Licensed Clinical Psychologist/ Neuropsychologist

## 2021-03-05 ENCOUNTER — TELEPHONE (OUTPATIENT)
Dept: NEUROLOGY | Age: 81
End: 2021-03-05

## 2021-03-05 NOTE — TELEPHONE ENCOUNTER
----- Message from Willy Childs sent at 3/5/2021  9:41 AM EST -----  Regarding: Dr. Viridiana Shukla  General Message/Vendor Calls    Caller's first and last name:Nikia Tirado      Reason for call:schedule f/up      Callback required yes/no and why:yes      Best contact number(s):[phone]      Details to clarify the request: Pt requested a call to schedule a f/up appt.       Willy Childs

## 2021-03-09 ENCOUNTER — OFFICE VISIT (OUTPATIENT)
Dept: NEUROLOGY | Age: 81
End: 2021-03-09
Payer: MEDICARE

## 2021-03-09 DIAGNOSIS — R41.81 AGE-RELATED COGNITIVE DECLINE: Primary | ICD-10-CM

## 2021-03-09 PROCEDURE — 96139 PSYCL/NRPSYC TST TECH EA: CPT | Performed by: PSYCHOLOGIST

## 2021-03-09 PROCEDURE — 96132 NRPSYC TST EVAL PHYS/QHP 1ST: CPT | Performed by: PSYCHOLOGIST

## 2021-03-09 PROCEDURE — 96138 PSYCL/NRPSYC TECH 1ST: CPT | Performed by: PSYCHOLOGIST

## 2021-03-09 PROCEDURE — 96133 NRPSYC TST EVAL PHYS/QHP EA: CPT | Performed by: PSYCHOLOGIST

## 2021-03-09 PROCEDURE — 96136 PSYCL/NRPSYC TST PHY/QHP 1ST: CPT | Performed by: PSYCHOLOGIST

## 2021-03-09 PROCEDURE — 96137 PSYCL/NRPSYC TST PHY/QHP EA: CPT | Performed by: PSYCHOLOGIST

## 2021-03-09 NOTE — PROGRESS NOTES
This note will not be viewable in [employer] for the following reason(s). Likely risk of substantial harm from the misinterpretation of the data generated by this evaluation. [facility] at [address]    Office:  [phone]  Fax: [phone]                                        Neuropsychological Evaluation Report    Patient Name: Flores Morin  Age: [de-identified] y.o. Gender: female   Occupation:  Handedness: right handed   Presenting Concern: Memory loss,   Primary Care Physician: Cortez Escalante MD  Referring Provider: Linn Pedraza MD    PATIENT HISTORY (OBTAINED DURING INITIAL CLINICAL EVALUATION):    REASON FOR REFERRAL:  This comprehensive and medically necessary neuropsychological assessment was requested to assist with a differential diagnosis of MCI. The use and purpose of this examination, as well as the extent and limitations of confidentiality, were explained prior to obtaining permission to participate. Instructions were provided regarding the necessity to put forth optimal effort and answer questions truthfully in order to obtain reliable and accurate test results.     PERTINENT HISTORY:  Ms. Michelle Farooq presented for a neuropsychological assessment at the recommendation of her treating physician secondary to complaints of memory loss and left hand constrictions. She has reported symptoms that include feeling slowed down. Ms. Michelle Farooq began noticing symptoms of forgetting about a year ago. From a brief review of her medical and personal history there has not been any other significant neurological injury or illness noted or reported. No family history of neurological problems. She did not report experiencing depression or anxiety in the past.       Ms. Michelle Farooq does not  report any problems at birth or difficulties meeting developmental milestones.  She reports that she had an adequate level of family support and was not subject to trauma or abuse as a child. Ms. Monse Brown does not  report being retain in school or receiving special assistance in any of she classes or subjects. Ms. Monse Brown completed 15 years of education.     Ms. Monse Brown does  exercise on a regular basis and does  maintain a balanced diet. She does not  report problems with sleep and does not  complain of pain. She does not  participate in mentally stimulating activities. Ms. Monse Brown does not  have concerns regarding prescription medications, family members, place of residence, or financial stressors. Ms. Monse Brown indicated that she is independent in her instrumental activities of daily living, including shopping, meal preparation, housekeeping, doing laundry, driving a car, managing medications, and finances.     METHODS OF ASSESSMENT (Current Evaluation):  Clinician Administered:  Clinical Interview  Review of Medical Records  Clock Drawing Task  Modified Mini-Mental Status Exam (3MS)  Test of Premorbid [facility] and Depression Scale  Revised Memory and Behavior Checklist    Technician Administered:  [facility]  Neuropsychological Assessment Battery 1   NAB: Attention Module   NAB: Executive Functions Module   NAB: Language Module   NAB: Spatial Module  Test of Memory Malingering  Test of Practical Judgment (9-Item)  niall 15 Making Test    TEST OBSERVATIONS:  Ms. Monse Brown arrived promptly for the testing session. Dress and grooming were appropriate; physical presentation was unchanged from that observed during the clinical interview. Speech was fluent, intelligible, and goal-directed. Affect was congruent with the euthymic mood conveyed. Ms. Monse Brown was adequately cooperative and appeared to put forth good effort throughout this examination. Rapport with the examiner was adequately established and maintained. Minimal prompting was required.   Comprehension of test instructions was not problematic. A limitation of this evaluation was they inability to assess her visual memory, do to limited functional use of her dominant right hand. Performance motivation was objectively measured by two instruments (TOMM, Reliable Digit Span), and Ms. Grover Crain produced a normal score on one of these measures. Accordingly, test findings below may be the product of disingenuous or inconsistent effort. Given the above observations, plus comments contained in the Mental Status section, the results of this examination are regarded as reasonably reliable and valid. TEST RESULTS:  Quantitative test results are derived from comparisons to age and education corrected cohort normative data, where applicable. Percentiles are included in these instances. Qualitative test results are determined using clinical observations. General Orientation and Awareness:       Orientation to person, year, month, day of month, day of week, state, town, and circumstance.    Awareness of deficits WNL                  Cognitive performance validity testing  Variable        Attention/Concentration:      Classification:  Simple visuomotor tracking (35 percentile)               Average  Digits forward (10 percentile)                 Low Average  Digits backward (3 percentile)                 Moderately Impaired  Visual scanning (2 percentile)                            Moderately Impaired  Simple information processing  efficiency (4 percentile)   Moderately Impaired   Complex information processing efficiency (16 percentile)  Low Average  Attention to visual detail of driving scenes (8 percentile)                      Mildly Impaired    Visuospatial and Constructional Praxis:     Visual discrimination (24 percentile)                            Low Average   Design construction (14 percentile)                 Low Average  Figure drawing copy (42 percentile) Average     Language:         Expressive speech in oral production (82 percentile)       High Average  Auditory comprehension (79 percentile)                   High Average   Naming (62 percentile)          Average  Reading comprehension (3 percentile)        Moderately Impaired   Writing (21 percentile)                     Low Average   Bill payment task (76 percentile)                    High Average    Memory and Learning:       Word list total recall (38 percentile)      Average  Word list delayed recall (76 percentile)                   High Average  Word list percent retention (89 percentile)                  High Average  Word list recognition discrimination (46 percentile)              Average      Cognitive Tests of Executive Functioning:     Trail Making B (18 percentile)                       Low Average  Mazes (69 percentile)                   Average  Simple judgment in daily decision making (93 percentile)              Superior  Complex judgment in daily decision making (66 percentile)               Average  Categories (46 percentile)                  Average  Word generation (54 percentile)                   Average      Adaptive Behavioral Measure of Daily Functioning:   Time:    >75 %ile   Money/calculations:     75 %ile  Communication:       9 %ile   Memory:        9 %ile    Total:     T= 37 (10%ile)    Low Average    IMPRESSIONS:  Ms. Cristobal Salazar was seen for a comprehensive neuropsychological evaluation and administered a battery of measures assessing the neurocognitive domains of attention, memory, language, visuospatial and executive functioning. Her overall level of functioning averaged across the 5 domains yielded a score in the average range. With the exception of attention, which was mildly impaired, all other neurocognitive domains were in the average range. Within the attention and concentration domain Ms. Cristobal Salazar was impaired on measures of working memory, visual scanning, simple information processing efficiency, and attention to visual detail. These findings do not point to a specific localized lesion or specific etiology. In addition, nothing in the provided history suggest that attention difficulties were previously a concern. Although she reports memory loss, her verbal memory remains relatively intact. Her adaptive daily functioning was noted to be the low average range. Although there was a moderate impairment in her reading comprehension, there was insufficient evidence of an aphasia. A limitation of this evaluation was the inability to assess her visual memory, do to limited functional use of her dominant right hand. In summary, Ms. Ellen Leavitt overall level of neurocognitive functioning at or near her baseline, with a possible decline in her attention due to unknown causes. DIAGNOSTIC IMPRESSIONS:    ICD-10-CM ICD-9-CM    1. Age-related cognitive decline  R41.81 294.9 [place] NEUROPSYCHOLOGICAL TST EVAL PHYS/QHP 1ST HOUR      [place] NEUROPSYCHOLOGICAL TST EVAL PHYS/QHP EA ADDL HR      [place] PSYL/NRPSYCL TST PHYS/QHP 2+ TST 1ST 30 MIN      [place] PSYCL/NRPSYCL TST PHYS/QHP 2+ TST EA ADDL 30 MIN      [place] PSYCL/NRPSYCL TST TECH 2+ TST 1ST 30 MIN      [place] PSYCL/NRPSYCL TST TECH 2+ TST EA ADDL 30 MIN      [place] PSYCL/NRPSYCL TST TECH 2+ TST EA ADDL 30 MIN      [place] PSYCL/NRPSYCL TST TECH 2+ TST EA ADDL 30 MIN      [place] PSYCL/NRPSYCL TST TECH 2+ TST EA ADDL 30 MIN      [place] PSYCL/NRPSYCL TST TECH 2+ TST EA ADDL 30 MIN         RECOMMENDATIONS:   1. Findings should be reviewed with Ms. Ellen Leavitt to insure her understanding and discuss the potential implications. 2. Emphasis should be placed on Ms. Ellen Leavitt obtaining good sleep hygiene and maintaining adequate physical exercise to promote good brain health. 3.  Ms. Ellen eLavitt is encouraged to seek out various forms of mental stimulation that would help to \"exercise\" her brain.  Following the completion of her physical therapy for her hand, and re-gaining functional use of the hand, a re-evaluation may shed more light on the loss of function in the left hemisphere. Thank you for allowing me the opportunity to assist you in Ms. Byrne's care. Please do not hesitate to contact me should you have additional questions that I may not have addressed. 21416 x 1  96137 x 1  96138 x 1  96139 x 5  96132 x 1  96133 x 1          Ale Egan, Ph.D., ABPP  Licensed Clinical Psychologist  Neuropsychologist  Board Certified Rehabilitation Psychologist      Time Documentation:     47934 x 1: Neurobehavioral Status Exam/Clinical Interview: (1 hour, (already billed on first date of service)     47363*6 Neuropsych testing/data gathering by Neuropsychologist (35 additional minutes, see above)      96138 x 1  96139 x 6 Test Administration/Data Gathering By Technician: (3.5 hours). 80022 x 1 (first 30 minutes), 46256 x 7 (each additional 30 minutes)     96132 x 1  96133 x 1 Testing Evaluation Services by Neuropsychologist (1 hour, 50 minutes) 96132 x 1 (first hour), 96133 x 1 (50 minutes)     Definitions:       25836/09799:  Neurobehavioral Status Exam, Clinical interview. Clinical assessment of thinking, reasoning and judgment, by neuropsychologist, both face to face time with patient and time interpreting those test results and reporting, first and subsequent hours)     46344/68788: Neuropsychological Test Administration by Technician/Psychometrist, first 30 minutes and each additional 30 minutes. The above includes: Record review. Review of history provided by patient. Review of collaborative information. Testing by Clinician. Review of raw data. Scoring. Report writing of individual tests administered by Clinician. Integration of individual tests administered by psychometrist with NSE/testing by clinician, review of records/history/collaborative information, case Conceptualization, treatment planning, clinical decision making, report writing, coordination Of Care.  Psychometry test codes as time spent by psychometrist administering and scoring neurocognitive/psychological tests under supervision of neuropsychologist.  Integral services including scoring of raw data, data interpretation, case conceptualization, report writing etcetera were initiated after the patient finished testing/raw data collected and was completed on the date the report was signed. This note will not be viewable in [address]

## 2021-03-11 ENCOUNTER — HOSPITAL ENCOUNTER (OUTPATIENT)
Dept: PHYSICAL THERAPY | Age: 81
Discharge: HOME | End: 2021-03-11
Payer: MEDICARE

## 2021-03-11 PROCEDURE — 97110 THERAPEUTIC EXERCISES: CPT

## 2021-03-11 PROCEDURE — 97112 NEUROMUSCULAR REEDUCATION: CPT

## 2021-03-11 NOTE — PROGRESS NOTES
PT DAILY TREATMENT NOTE - [facility] -15    Patient Name: Isamar Robert  Date:3/11/2021  : 1940  [x]  Patient  Verified  [place] / Plan: [place] / Product Type: Medicare /    In time:215  Out time:322  Total Treatment Time (min): 67  Total Timed Codes (min): 67  1:1 Treatment Time (MC only): 79   Visit #:  2    Treatment Area: Muscle weakness (generalized) [M62.81]    SUBJECTIVE  Pain Level (0-10 scale): 0/10  Any medication changes, allergies to medications, adverse drug reactions, diagnosis change, or new procedure performed?: [x] No    [] Yes (see summary sheet for update)  Subjective functional status/changes:   [] No changes reported  Patient reports she was walking with her son this morning and began to feel unsteady. She needed to take a break and reset herself before continuing. OBJECTIVE    18 min Therapeutic Exercise:  [x] See flow sheet :   Rationale: increase ROM and increase strength to improve the patients ability to perform ADLs     49 min Neuromuscular Re-education:  [x]  See flow sheet :   Rationale: increase ROM, increase strength, improve coordination and increase proprioception  to improve the patients ability to perform ADLs      With   [] TE   [] TA   [] Neuro   [] SC   [] other: Patient Education: [x] Review HEP    [] Progressed/Changed HEP based on:   [] positioning   [] body mechanics   [] transfers   [] heat/ice application    [] other:      Other Objective/Functional Measures: none noted     Pain Level (0-10 scale) post treatment: 0/10    ASSESSMENT/Changes in Function:   Patient demonstrated fair stability bilaterally. Difficulty with fixating on specific fingers with gripper. Difficult screwing on and off all sizes. Will continue to progress as tolerated.   Patient will continue to benefit from skilled PT services to modify and progress therapeutic interventions, address functional mobility deficits, address ROM deficits, address strength deficits, analyze and address soft tissue restrictions, analyze and cue movement patterns, analyze and modify body mechanics/ergonomics, assess and modify postural abnormalities and address imbalance/dizziness to attain remaining goals. [x]  See Plan of Care  []  See progress note/recertification  []  See Discharge Summary         Progress towards goals / Updated goals:  Patient is progressing towards goals.       PLAN  [x]  Upgrade activities as tolerated     [x]  Continue plan of care  [x]  Update interventions per flow sheet       []  Discharge due to:_  []  Other:_      Adam Cooper, SANDRO 3/11/2021

## 2021-03-15 ENCOUNTER — HOSPITAL ENCOUNTER (OUTPATIENT)
Dept: PHYSICAL THERAPY | Age: 81
Discharge: HOME | End: 2021-03-15
Payer: MEDICARE

## 2021-03-15 PROCEDURE — 97110 THERAPEUTIC EXERCISES: CPT

## 2021-03-15 PROCEDURE — 97112 NEUROMUSCULAR REEDUCATION: CPT

## 2021-03-15 NOTE — PROGRESS NOTES
PT DAILY TREATMENT NOTE - [facility] 2-15    Patient Name: Paramjit Jose  Date:3/15/2021  : 1940  [x]  Patient  Verified  [place] / Plan: [place] & [place] / Product Type: Medicare /    In time:116  Out time:210  Total Treatment Time (min): 54  Total Timed Codes (min): 40  1:1 Treatment Time ( only): 40   Visit #:  3    Treatment Area: Muscle weakness (generalized) [M62.81]    SUBJECTIVE  Pain Level (0-10 scale): 0/10  Any medication changes, allergies to medications, adverse drug reactions, diagnosis change, or new procedure performed?: [x] No    [] Yes (see summary sheet for update)  Subjective functional status/changes:   [] No changes reported  Patient reports she continues to side step while walking with her son, but it has been getting better. She was unable to hold a can of vegetables for long enough in the air to open a can. OBJECTIVE    24 min Therapeutic Exercise:  [x] See flow sheet :   Rationale: increase ROM and increase strength to improve the patients ability to perform ADLs and reduce fall risk    30 min Neuromuscular Re-education:  [x]  See flow sheet :   Rationale: increase ROM, increase strength, improve coordination, improve balance and increase proprioception  to improve the patients ability to perform ADLs and reduce fall risk    With   [] TE   [] TA   [] Neuro   [] SC   [] other: Patient Education: [x] Review HEP    [] Progressed/Changed HEP based on:   [] positioning   [] body mechanics   [] transfers   [] heat/ice application    [] other:      Other Objective/Functional Measures: none noted     Pain Level (0-10 scale) post treatment: 0/10    ASSESSMENT/Changes in Function:   Patient demonstrated slight challenges when performing dynamic movements on the red mat. Struggles with thumb abduction. Improved  strength with BAPs screw on and off. Will continue to progress as tolerated.   Patient will continue to benefit from skilled PT services to modify and progress therapeutic interventions, address functional mobility deficits, address ROM deficits, address strength deficits, analyze and address soft tissue restrictions, analyze and cue movement patterns, analyze and modify body mechanics/ergonomics and assess and modify postural abnormalities to attain remaining goals. [x]  See Plan of Care  []  See progress note/recertification  []  See Discharge Summary         Progress towards goals / Updated goals:  Patient is progressing towards goals.      PLAN  [x]  Upgrade activities as tolerated     [x]  Continue plan of care  [x]  Update interventions per flow sheet       []  Discharge due to:_  []  Other:_      Mahendra Flanagan, PTA 3/15/2021

## 2021-03-16 ENCOUNTER — TELEPHONE (OUTPATIENT)
Dept: NEUROLOGY | Age: 81
End: 2021-03-16

## 2021-03-18 ENCOUNTER — HOSPITAL ENCOUNTER (OUTPATIENT)
Dept: PHYSICAL THERAPY | Age: 81
Discharge: HOME | End: 2021-03-18
Payer: MEDICARE

## 2021-03-18 PROCEDURE — 97535 SELF CARE MNGMENT TRAINING: CPT

## 2021-03-18 PROCEDURE — 97110 THERAPEUTIC EXERCISES: CPT

## 2021-03-18 NOTE — PROGRESS NOTES
PT DAILY TREATMENT NOTE - [facility] 2-15    Patient Name: Flores Morin  Date:3/18/2021  : 1940  [x]  Patient  Verified  [place]or: [place] MED[place] / Plan: [place] MED[place]ARE [place] & B / Product Type: Medicare /    In time: 3535  Out time: 1225  Total Treatment Time (min): 47  Total Timed Codes (min): 47  1:1 Treatment Time ( only): 47   Visit #:  4    Treatment Area: Muscle weakness (generalized) [M62.81]    SUBJECTIVE  Pain Level (0-10 scale): 0  Any medication changes, allergies to medications, adverse drug reactions, diagnosis change, or new procedure performed?: [x] No    [] Yes (see summary sheet for update)  Subjective functional status/changes:   [] No changes reported    The patient reports she continues to have increasing difficulty with her balance while walking over the past week; she notes she tends to lose balance sideways occasionally at home and while walking outside. She walked 1/2 mile outside with her son yesterday, navigating tree roots well along gravel path. She also notes recently increased difficulty straightening her L index finger; she has been working to massage and splint this finger straight using a matchbox at home overnight. OBJECTIVE    22 min Therapeutic Exercise:  [x] See flow sheet :   Rationale: increase ROM and increase strength to improve the patients ability to perform ADLs and reduce fall risk    25 min Self Care/Home Management:  []  See flow sheet : Includes discussion/education regarding pathophysiology of current condition, including appropriate time frame to expect functional progress, return of UE versus LE function, and goals/expectations for therapy. Educated patient on use of retrograde edema/soft tissue massage and night splinting to improve L index finger positioning and avoid contractures. Discussed task breakdown during functional tasks to include key gripping and rotating, typing progressions, and buttoning tasks.  Patient verbalizing and demonstrating understanding of provided education with 100% accuracy using teach back method. Rationale: increase ROM, increase strength, improve coordination, improve balance and increase proprioception  to improve the patients ability to self-manage condition in home environment     With   [x] TE   [] TA   [] Neuro   [x] SC   [] other: Patient Education: [x] Review HEP    [] Progressed/Changed HEP based on:   [] positioning   [] body mechanics   [] transfers   [] heat/ice application    [] other:      Other Objective/Functional Measures: None noted     Pain Level (0-10 scale) post treatment: 0/10    ASSESSMENT/Changes in Function:   The patient tolerated therapy visit well at this date. Spent significant time at outset of session discussing pathophysiology of current condition and strategies to maintain L second digit extension via splinting methods, as well as task breakdown with functional movements. Patient requiring increased time to complete wall ladder exercise as she demonstrates continued difficulty isolating L digit motion, particularly at fourth/fifth digits with ascent > descent, and requires use of compensatory L UE abduction/elevation to assist with distal fine motor tasks. Patient benefiting from cues to mimic desired movement using R hand to trial mirror approach to neuromuscular re-education. Patient very motivated and demonstrating great patience in attempting to perform tasks appropriate to desired strategies. Continue to progress as tolerated. Patient will continue to benefit from skilled PT services to modify and progress therapeutic interventions, address functional mobility deficits, address ROM deficits, address strength deficits, analyze and address soft tissue restrictions, analyze and cue movement patterns, analyze and modify body mechanics/ergonomics and assess and modify postural abnormalities to attain remaining goals.      [x]  See Plan of Care  []  See progress note/recertification  []  See Discharge Summary         Progress towards goals / Updated goals:    Short Term Goals: To be accomplished in 6-8 treatments: The patient will demonstrate independence with updated and progressive HEP to demonstrate increased participation with PT plan of care. - Progressing              The patient will perform Timed Up and Go in <= 13 seconds without assistive device (average of two trials) to demonstrate improved community ambulation potential. - Progressing  Long Term Goals: To be accomplished in 12-16 treatments: The patient will demonstrate L  strength increased to >= 20 lbs. (average of two trials) to demonstrate improved ability to , lift, and type. - Progressing              The patient will demonstrate L UE/LE strength increased by >= 1/2 MMT grade toward improved endurance and safety with functional tasks. - Progressing               The patient will score >= 60 on UE FOTO to demonstrate significantly improved subjective report of function with typing. - Progressing  Frequency / Duration: Patient to be seen 2 times per week for 12-16 treatments.     PLAN  [x]  Upgrade activities as tolerated     [x]  Continue plan of care  [x]  Update interventions per flow sheet       []  Discharge due to:_  []  Other:_      Ekaterina Monroy, PT, DPT 3/18/2021

## 2021-03-22 ENCOUNTER — HOSPITAL ENCOUNTER (OUTPATIENT)
Dept: PHYSICAL THERAPY | Age: 81
Discharge: HOME | End: 2021-03-22
Payer: MEDICARE

## 2021-03-22 PROCEDURE — 97112 NEUROMUSCULAR REEDUCATION: CPT

## 2021-03-22 PROCEDURE — 97110 THERAPEUTIC EXERCISES: CPT

## 2021-03-22 NOTE — PROGRESS NOTES
PT DAILY TREATMENT NOTE - [facility] 2-15    Patient Name: Vandana Scanlon  Date:3/22/2021  : 1940  [x]  Patient  Verified  [place]or: [place] MED[place] / Plan: [place] MED[place] & B / Product Type: Medicare /    In time:1230  Out time:120  Total Treatment Time (min): 50  Total Timed Codes (min): 45  1:1 Treatment Time ( [address] only): 39   Visit #:  5    Treatment Area: Muscle weakness (generalized) [M62.81]    SUBJECTIVE  Pain Level (0-10 scale): 0/10  Any medication changes, allergies to medications, adverse drug reactions, diagnosis change, or new procedure performed?: [x] No    [] Yes (see summary sheet for update)  Subjective functional status/changes:   [] No changes reported  Patient reports she was walking with her son     OBJECTIVE    20 min Therapeutic Exercise:  [x] See flow sheet :   Rationale: increase ROM and increase strength to improve the patients ability to perform ADLs    30 min Neuromuscular Re-education:  [x]  See flow sheet :   Rationale: increase ROM, increase strength, improve coordination, improve balance and increase proprioception  to improve the patients ability to perform ADLs     With   [] TE   [] TA   [] Neuro   [] SC   [] other: Patient Education: [x] Review HEP    [] Progressed/Changed HEP based on:   [] positioning   [] body mechanics   [] transfers   [] heat/ice application    [] other:      Other Objective/Functional Measures: none noted     Pain Level (0-10 scale) post treatment: 0/10    ASSESSMENT/Changes in Function:   Patient struggled with controled weight shift. Difficult with specific finger ROM. Will continue to progress as tolerated.   Patient will continue to benefit from skilled PT services to modify and progress therapeutic interventions, address functional mobility deficits, address ROM deficits, address strength deficits, analyze and address soft tissue restrictions, analyze and cue movement patterns, analyze and modify body mechanics/ergonomics and assess and modify postural abnormalities to attain remaining goals. [x]  See Plan of Care  []  See progress note/recertification  []  See Discharge Summary         Progress towards goals / Updated goals:   Patient is progressing towards goals.      PLAN  [x]  Upgrade activities as tolerated     [x]  Continue plan of care  [x]  Update interventions per flow sheet       []  Discharge due to:_  []  Other:_      Rosie Love, PTA 3/22/2021

## 2021-03-25 ENCOUNTER — OFFICE VISIT (OUTPATIENT)
Dept: NEUROLOGY | Age: 81
End: 2021-03-25
Payer: MEDICARE

## 2021-03-25 ENCOUNTER — HOSPITAL ENCOUNTER (OUTPATIENT)
Dept: PHYSICAL THERAPY | Age: 81
Discharge: HOME | End: 2021-03-25
Payer: MEDICARE

## 2021-03-25 DIAGNOSIS — R41.81 AGE-RELATED COGNITIVE DECLINE: Primary | ICD-10-CM

## 2021-03-25 PROCEDURE — 97112 NEUROMUSCULAR REEDUCATION: CPT

## 2021-03-25 PROCEDURE — 90832 PSYTX W PT 30 MINUTES: CPT | Performed by: PSYCHOLOGIST

## 2021-03-25 PROCEDURE — 97535 SELF CARE MNGMENT TRAINING: CPT

## 2021-03-25 PROCEDURE — G8432 DEP SCR NOT DOC, RNG: HCPCS | Performed by: PSYCHOLOGIST

## 2021-03-25 PROCEDURE — G8420 CALC BMI NORM PARAMETERS: HCPCS | Performed by: PSYCHOLOGIST

## 2021-03-25 PROCEDURE — G8536 NO DOC ELDER MAL SCRN: HCPCS | Performed by: PSYCHOLOGIST

## 2021-03-25 PROCEDURE — 97110 THERAPEUTIC EXERCISES: CPT

## 2021-03-25 PROCEDURE — G8427 DOCREV CUR MEDS BY ELIG CLIN: HCPCS | Performed by: PSYCHOLOGIST

## 2021-03-25 NOTE — PROGRESS NOTES
PT DAILY TREATMENT NOTE - [facility] 2-15    Patient Name: Aaron Purchase  Date:3/25/2021  : 1940  [x]  Patient  Verified  Payor: [place] MED[place]ARE / Plan: [place] MEDICARE PART A & B / Product Type: Medicare /    In time:   Out time: 1153  Total Treatment Time (min): 60  Total Timed Codes (min): 60  1:1 Treatment Time ( only): 60   Visit #:  6    *Patient arrived 40 minutes early to appointment today; able to be seen early due to therapist schedule*    Treatment Area: Muscle weakness (generalized) [M62.81]    SUBJECTIVE  Pain Level (0-10 scale): 0  Any medication changes, allergies to medications, adverse drug reactions, diagnosis change, or new procedure performed?: [x] No    [] Yes (see summary sheet for update)  Subjective functional status/changes:   [] No changes reported    The patient reports she fell on asphalt on Saturday while walking outside with her son, \"banged up\" her knee, but otherwise well and knee is improving. She notes she has increased swelling in L breast/chest area and L UE just starting this morning, making it tough to put on bra. She has continued to walk around her neighborhood. OBJECTIVE    20 min Therapeutic Exercise:  [x] See flow sheet :   Rationale: increase ROM and increase strength to improve the patients ability to perform ADLs and reduce fall risk    23 min Neuromuscular Re-education:  [x]  See flow sheet :   Rationale: increase ROM, increase strength, improve coordination, improve balance and increase proprioception  to improve the patients ability to perform ADLs and reduce fall risk    17 min Self Care/Home Management:  []? See flow sheet : Includes discussion/education regarding pathophysiology of current condition, including appropriate time frame to expect functional progress and goals/expectations for therapy. Reviewed role of lymphatic system in fluid drainage as relates to potential contribution to L UE/chest swelling this morning.  Discussed task breakdown during functional tasks to include key gripping and rotating, typing progressions, and buttoning tasks. Patient verbalizing and demonstrating understanding of provided education with 100% accuracy using teach back method. Rationale: increase ROM, increase strength, improve coordination, improve balance and increase proprioception  to improve the patients ability to self-manage condition in home environment     With   [x] TE   [] TA   [x] Neuro     [x] SC   [] other: Patient Education: [x] Review HEP    [] Progressed/Changed HEP based on:   [] positioning   [] body mechanics   [] transfers   [] heat/ice application    [] other:      Other Objective/Functional Measures: Patient ambulates with L UE adducted/internally rotated and flexed at elbow throughout session; able to self-correct with intermittent cues     Pain Level (0-10 scale) post treatment: 0/10    ASSESSMENT/Changes in Function:   The patient tolerated therapy visit well at this date. Spent time at outset of session discussing pathophysiology of current condition and strategies for task breakdown with functional movements. Patient requiring increased time to complete wall ladder exercise as she demonstrates continued difficulty isolating L digit motion, particularly at fourth/fifth digits with ascent > descent, and requires use of compensatory L UE abduction/elevation to assist with distal fine motor tasks. Patient fatiguing rapidly with emphasis on L UE exercise with fine motor bias, particularly evident during second set of finger web opposition, where patient could no longer dissociate fingers during exercise. Patient demonstrates minimal thenar/hypothenar atrophy along L hand. Patient continuing to benefit from cues to mimic desired movement using R hand to trial mirror approach to neuromuscular re-education, particularly with wall ladder and finger web activiites.  Patient very motivated and demonstrating great patience in attempting to perform tasks appropriate to desired strategies. Continue to progress as tolerated. Patient will continue to benefit from skilled PT services to modify and progress therapeutic interventions, address functional mobility deficits, address ROM deficits, address strength deficits, analyze and address soft tissue restrictions, analyze and cue movement patterns, analyze and modify body mechanics/ergonomics and assess and modify postural abnormalities to attain remaining goals. [x]  See Plan of Care  []  See progress note/recertification  []  See Discharge Summary         Progress towards goals / Updated goals:     Short Term Goals: To be accomplished in 6-8 treatments:              ZQW patient will demonstrate independence with updated and progressive HEP to demonstrate increased participation with PT plan of care. - Progressing              The patient will perform Timed Up and Go in <= 13 seconds without assistive device (average of two trials) to demonstrate improved community ambulation potential. - Progressing  Long Term Goals: To be accomplished in 12-16 treatments:              The patient will demonstrate L  strength increased to >= 20 lbs. (average of two trials) to demonstrate improved ability to , lift, and type. - Progressing              The patient will demonstrate L UE/LE strength increased by >= 1/2 MMT grade toward improved endurance and safety with functional tasks. - Progressing               QFS patient will score >= 60 on UE FOTO to demonstrate significantly improved subjective report of function with typing. - Progressing  Frequency / Duration: Patient to be seen 2 times per week for 12-16 treatments.     PLAN  [x]  Upgrade activities as tolerated     [x]  Continue plan of care  [x]  Update interventions per flow sheet       []  Discharge due to:_  []  Other:_      Micheline Figueroa, PT, DPT 3/25/2021

## 2021-03-25 NOTE — PROGRESS NOTES
[facility] [address]    Office:  [phone]  Fax: [phone]                 Follow-up Session    Patient Dain Cooney  Age: 80 y.o. Gender: female   Occupation:  [employer]  Presenting Concern: Memory loss,   Primary Care Physician: Yaya Meza MD  Referring Malick Bird MD     Tristin Gloria is a [de-identified] y.o. female who presents today for feedback following recent neuropsychological testing. The results were reviewed of the recent neuropsychological evaluation, including discussing individual tests as well as the patient's areas of neurocognitive strength versus their weaknesses. Supportive counseling and education was provided regarding my diagnostic impressions, and we discussed next steps for further evaluation down the road. Patient's mood was generally positive and all her concerns were addressed. We discussed various methods to improve cognitive cognition and mood. The patient is encouraged to follow-up with the referring provider. DIAGNOSTIC IMPRESSIONS:    ICD-10-CM ICD-9-CM    1. Age-related cognitive decline  R41.81 294.9        Time spent with patient in face to face conversation: 16 mins.     [id number] 30 minutes x 1    Supriya Green, PHD

## 2021-03-29 ENCOUNTER — HOSPITAL ENCOUNTER (OUTPATIENT)
Dept: PHYSICAL THERAPY | Age: 81
Discharge: HOME | End: 2021-03-29
Payer: MEDICARE

## 2021-03-29 PROCEDURE — 97110 THERAPEUTIC EXERCISES: CPT

## 2021-03-29 PROCEDURE — 97112 NEUROMUSCULAR REEDUCATION: CPT

## 2021-03-29 NOTE — PROGRESS NOTES
PT DAILY TREATMENT NOTE - [facility] 2-15    Patient Name: Mauro Méndez  Date:3/29/2021  : 1940  [x]  Patient  Verified  Payor: [place] MEDICARE / Plan: [place] MEDICARE PART A & B / Product Type: Medicare /    In time:1233  Out time:126  Total Treatment Time (min): 55  Total Timed Codes (min): 50  1:1 Treatment Time ( [address] only): 50   Visit #:  7    Treatment Area: Muscle weakness (generalized) [M62.81]    SUBJECTIVE  Pain Level (0-10 scale): 0/10  Any medication changes, allergies to medications, adverse drug reactions, diagnosis change, or new procedure performed?: [x] No    [] Yes (see summary sheet for update)  Subjective functional status/changes:   [] No changes reported  Patient reports she continues to work on typing, but is having issues with extending and flexing her fingers. OBJECTIVE    15 min Therapeutic Exercise:  [x] See flow sheet :   Rationale: increase ROM and increase strength to improve the patients ability to perform ADLs     40 min Neuromuscular Re-education:  [x]  See flow sheet :   Rationale: increase ROM, increase strength, improve coordination, improve balance and increase proprioception  to improve the patients ability to perform ADLs     With   [] TE   [] TA   [] Neuro   [] SC   [] other: Patient Education: [x] Review HEP    [] Progressed/Changed HEP based on:   [] positioning   [] body mechanics   [] transfers   [] heat/ice application    [] other:      Other Objective/Functional Measures: none noted     Pain Level (0-10 scale) post treatment: 0/10    ASSESSMENT/Changes in Function:   Patient improved slightly on coordinating middle and index finger on a keyboard. Very limited with wrist extension. Tolerated all therex, will continue to progress as tolerated.   Patient will continue to benefit from skilled PT services to modify and progress therapeutic interventions, address functional mobility deficits, address ROM deficits, address strength deficits, analyze and address soft tissue restrictions, analyze and cue movement patterns, analyze and modify body mechanics/ergonomics and assess and modify postural abnormalities to attain remaining goals. [x]  See Plan of Care  []  See progress note/recertification  []  See Discharge Summary         Progress towards goals / Updated goals:  Patient is progressing towards goals.      PLAN  [x]  Upgrade activities as tolerated     [x]  Continue plan of care  [x]  Update interventions per flow sheet       []  Discharge due to:_  []  Other:_      Damian Murray, PTA 3/29/2021

## 2021-04-01 ENCOUNTER — APPOINTMENT (OUTPATIENT)
Dept: PHYSICAL THERAPY | Age: 81
End: 2021-04-01
Payer: MEDICARE

## 2021-04-05 ENCOUNTER — HOSPITAL ENCOUNTER (OUTPATIENT)
Dept: PHYSICAL THERAPY | Age: 81
Discharge: HOME | End: 2021-04-05
Payer: MEDICARE

## 2021-04-05 PROCEDURE — 97535 SELF CARE MNGMENT TRAINING: CPT

## 2021-04-05 PROCEDURE — 97110 THERAPEUTIC EXERCISES: CPT

## 2021-04-05 NOTE — PROGRESS NOTES
[facility]  [address] ([address]), [address]  [place] [address]  Phone: [phone] Fax: [phone]    Progress Note    Name: Lynette Gallo   : 1940   MD: Federico Mejia MD       Treatment Diagnosis: Muscle weakness (generalized) [M62.81]  Start of Care: 21    Visits from Start of Care: 8  Missed Visits: 1    Summary of Care: Therapy has included therapeutic exercise, neuromuscular re-education, and self-care techniques to improve multiplanar L UE ROM and fine motor skills, L UE/LE strength/stability, postural control, and functional endurance/independence due to functional deficits 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 Assessment / Recommendations: The patient is an [de-identified]year old female who has participated in 8 skilled physical therapy visits due to 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 She demonstrates continued L > R strength impairments, however patient able to isolate LE musculature well with MMT and demonstrates difficulty with combined functional movements such as sit <-> stand transfers. She demonstrates minimal progress with L  strength since initial evaluation, however now able to oppose L thumb to first three digits (first two at initial evaluation). She continues to demonstrate difficulty with fine motor tasks using L UE due to decreased ability to isolate digits and perform complex tasks such as pinch  and  with forearm supination/pronation without L shoulder compensatory movement. She scored 50/56 on FITZPATRICK and performed Timed Up and Go in 17 seconds, demonstrating improved postural control and community ambulation potential. The patient has met 0/5 goals for physical therapy services, however demonstrates slow, steady or expected progress toward goals at this time.  Patient and her son benefited from education as to appropriate direction of therapy services with increased emphasis on LE strengthening in functional positions to improve safety and independence. She would benefit from continued skilled physical therapy services to improve L UE fine motor skills, postural control, and functional UE/LE strength to improve quality of life. Short Term Goals: To be accomplished in 6-8 treatments:              ZRY patient will demonstrate independence with updated and progressive HEP to demonstrate increased participation with PT plan of care. - Progressing, discussed importance of appropriate dosage and intensity of HEP              The patient will perform Timed Up and Go in <= 13 seconds without assistive device (average of two trials) to demonstrate improved community ambulation potential. - Progressing  Long Term Goals: To be accomplished in 12-16 treatments:              The patient will demonstrate L  strength increased to >= 20 lbs. (average of two trials) to demonstrate improved ability to , lift, and type. - Continue goal              The patient will demonstrate L UE/LE strength increased by >= 1/2 MMT grade toward improved endurance and safety with functional tasks. - Progressing               The patient will score >= 60 on UE FOTO to demonstrate significantly improved subjective report of function with typing. - Progressing  Frequency / Duration: Patient to be seen 2 times per week for 4-8 treatments.     Yamilet Hernadez, PT, DPT 4/5/2021

## 2021-04-05 NOTE — PROGRESS NOTES
PT DAILY TREATMENT NOTE - [facility] 2-15    Patient Name: Mauro Méndez  Date:2021  : 1940  [x]  Patient  Verified  [place]or[place] / Plan: [place] & B / Product Type: Medicare /    In time: 8784  Out time: 1210  Total Treatment Time (min): 62  Total Timed Codes (min): 62  1:1 Treatment Time ( only): 62   Visit #:  8    Treatment Area: Muscle weakness (generalized) [M62.81]    SUBJECTIVE  Pain Level (0-10 scale): 0/10  Any medication changes, allergies to medications, adverse drug reactions, diagnosis change, or new procedure performed?: [x] No    [] Yes (see summary sheet for update)  Subjective functional status/changes:   [] No changes reported    *Patient's son Geri Lozoya) present throughout therapy visit today*    The patient and her son report she feels as though she is going \"downhill\" overall, with increased weakness in her left leg and difficulty regaining fine motor use of her left hand. Her son notes she has increased difficulty exiting her low car, and can only walk about 1/4 mile (1/2 mile previously) during their daily walks. The patient notes this is due to fear of falling as she has fallen out there walking before.     OBJECTIVE    Other Observations: Patient requires increased time to complete transfers throughout evaluation, able to complete without assistive device over even surfaces  Gait and Functional Mobility: Patient uses Hurrycane in R UE to ambulate, demonstrates decreased tyron, decreased stance time on L, mild L knee flexion, decreased L hip extension during stance phase, maintains L UE in flexed/adducted/IR position (able to self-correct to increase L arm swing moderately with cues)     LOWER QUARTER                            MUSCLE STRENGTH  KEY                                                                             R                      L  0 - No Contraction                   Knee ext                      4                    4+  1 - Trace flex                     4+                    4  2 - Poor                                   Hip ext                         4                      3+  3 - Fair                                           flex                         4                      4  4 - Good                                        abd                        5                      4  5 - Normal                                     add                        4                      4                                                  Ankle DF                     4+                    4                                                            PF                     4                      4     UPPER QUARTER                             MUSCLE STRENGTH  KEY                                                                             R                      L  0 - No Contraction                               Flexion             5                      4  1 - Trace                                                2 - Poor                                               Abduction        5                      4+  3 - Fair                                                 IR                     5                      5  4 - Good                                              ER                   5                      4+  5 - Normal                                           Thumb Abd      4                      3+                                                              Finger Abd       4                       2         Strength:     R = 28, 33 lbs; average 30.5 lbs     L = 5, 5 lbs; average 5 lbs    Opposition: Patient demonstrates ability to oppose digits 1-4 at this time; continues to demonstrate decreased ability to oppose 5th digit               FITZPATRICK Balance Scale: 50/56  Timed Up and Go (no assistive device): 18 seconds, 16 seconds; average = 17 seconds  30 Second Sit to Stand: 1.5 times (patient attempting multiple times to achieve concentric phase from standard chair without use of UEs, unable to complete due to fatigue after first attempt)    32 min Therapeutic Exercise:  [x] See flow sheet : Includes time spent on re-assessment tests and measures   Rationale: increase ROM and increase strength to improve the patients ability to perform ADLs     30 min Self Care/Home Management:  []  See flow sheet : Discussed/educated patient and her son on importance of maintaining intensity of exercise program in home environment, with recommendation to incorporate alarm/schedule to increase frequency/consistency of HEP performance. Educated patient and her son on available research regarding exercise intensity/mode in improvements targeting strength, endurance, and fine motor tasks. Recommend patient target >= 20 minutes on stationary bike with minimal-moderate resistance to improve endurance and cardiovascular fitness toward improved ability to complete daily walk in home environment. Patient and her son verbalizing and demonstrating understanding of provided education with 100% accuracy at this date.    Rationale: increase ROM, increase strength, improve coordination, improve balance and increase proprioception  to improve the patients ability to self-manage current condition in home environment    With   [x] TE   [] TA   [] Neuro   [x] SC   [] other: Patient Education: [x] Review HEP    [] Progressed/Changed HEP based on:   [] positioning   [] body mechanics   [] transfers   [] heat/ice application    [] other:      Other Objective/Functional Measures: Patient ambulates without use of AD, holding Hurrycane in R UE, requiring intermittent cues to maintain L arm swing versus holding into adduction/internal rotation/elbow flexion during ambulation     Pain Level (0-10 scale) post treatment: 0/10    ASSESSMENT/Changes in Function:   The patient is an [de-identified]year old female who has participated in 8 skilled physical therapy visits due to 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 She demonstrates continued L > R strength impairments, however patient able to isolate LE musculature well with MMT and demonstrates difficulty with combined functional movements such as sit <-> stand transfers. She demonstrates minimal progress with L  strength since initial evaluation, however now able to oppose L thumb to first three digits (first two at initial evaluation). She continues to demonstrate difficulty with fine motor tasks using L UE due to decreased ability to isolate digits and perform complex tasks such as pinch  and  with forearm supination/pronation without L shoulder compensatory movement. She scored 50/56 on FITZPATRICK and performed Timed Up and Go in 17 seconds, demonstrating improved postural control and community ambulation potential. The patient has met 0/5 goals for physical therapy services, however demonstrates slow, steady or expected progress toward goals at this time. Patient and her son benefited from education as to appropriate direction of therapy services with increased emphasis on LE strengthening in functional positions to improve safety and independence. She would benefit from continued skilled physical therapy services to improve L UE fine motor skills, postural control, and functional UE/LE strength to improve quality of life. The patient presented with her son and tolerated therapy visit well at this date. Patient demonstrates significant difficulty rising from lowered mat table despite use of UEs, requiring multiple attempts and demonstrating bilateral knee valgus/decreased anterior weight shift to complete transfer. Patient very motivated and demonstrating great patience in attempting to perform tasks appropriate to desired strategies. Continue to progress as tolerated.   Patient will continue to benefit from skilled PT services to modify and progress therapeutic interventions, address functional mobility deficits, address ROM deficits, address strength deficits, analyze and address soft tissue restrictions, analyze and cue movement patterns, analyze and modify body mechanics/ergonomics and assess and modify postural abnormalities to attain remaining goals. []  See Plan of Care  [x]  See progress note/recertification  []  See Discharge Summary         Progress towards goals / Updated goals:    Short Term Goals: To be accomplished in 6-8 treatments:              ZUS patient will demonstrate independence with updated and progressive HEP to demonstrate increased participation with PT plan of care. - Progressing, discussed importance of appropriate dosage and intensity of HEP              The patient will perform Timed Up and Go in <= 13 seconds without assistive device (average of two trials) to demonstrate improved community ambulation potential. - Progressing  Long Term Goals: To be accomplished in 12-16 treatments:              The patient will demonstrate L  strength increased to >= 20 lbs. (average of two trials) to demonstrate improved ability to , lift, and type. - Continue goal              The patient will demonstrate L UE/LE strength increased by >= 1/2 MMT grade toward improved endurance and safety with functional tasks. - Progressing               The patient will score >= 60 on UE FOTO to demonstrate significantly improved subjective report of function with typing. - Progressing  Frequency / Duration: Patient to be seen 2 times per week for 4-8 treatments.     PLAN  [x]  Upgrade activities as tolerated     [x]  Continue plan of care  [x]  Update interventions per flow sheet       []  Discharge due to:_  []  Other:_      Belton Cowden, PT, DPT 4/5/2021

## 2021-04-08 ENCOUNTER — HOSPITAL ENCOUNTER (OUTPATIENT)
Dept: PHYSICAL THERAPY | Age: 81
Discharge: HOME | End: 2021-04-08
Payer: MEDICARE

## 2021-04-08 PROCEDURE — 97110 THERAPEUTIC EXERCISES: CPT

## 2021-04-08 PROCEDURE — 97535 SELF CARE MNGMENT TRAINING: CPT

## 2021-04-08 NOTE — PROGRESS NOTES
PT DAILY TREATMENT NOTE - [facility] 2-15    Patient Name: Agueda Celaya  Date:2021  : 1940  [x]  Patient  Verified  Payor: [place] MED[place] / Plan: [place] MED[place]ARE PART A & B / Product Type: Medicare /    In time: 272  Out time: 5879  Total Treatment Time (min): 54  Total Timed Codes (min): 54  1:1 Treatment Time ( only): 47   Visit #:  9    Treatment Area: Muscle weakness (generalized) [M62.81]    SUBJECTIVE  Pain Level (0-10 scale): 0/10  Any medication changes, allergies to medications, adverse drug reactions, diagnosis change, or new procedure performed?: [x] No    [] Yes (see summary sheet for update)  Subjective functional status/changes:   [] No changes reported    *Patient's son Jamila Chiu) present throughout therapy visit today*    The patient reports she has been doing well; she has written down what she is working on over the past few days. She notes she was able to complete 1/2 mile walk using her cane about half the time Monday, however was only able to complete 1/4 mile yesterday with heavy use of cane. She has been performing sit <-> stands using heavy upper body support at stationary bicycle. OBJECTIVE    29 min Therapeutic Exercise:  [x] See flow sheet :   Rationale: increase ROM and increase strength to improve the patients ability to perform ADLs     25 min Self Care/Home Management:  []  See flow sheet : Discussed/educated patient and her son on importance of maintaining intensity of exercise program in home environment. Reviewed patient's written log of past few exercise days, with discussion of appropriate cardiovascular/fine motor/lower body strength challenges.  Continued to recommend patient target >= 20 minutes on stationary bike with minimal-moderate resistance to improve endurance and cardiovascular fitness toward improved ability to complete daily walk in home environment; recommended patient build by one-two minutes every few days to one week as tolerated to emphasize endurance bias. Patient and her son verbalizing and demonstrating understanding of provided education with 100% accuracy at this date. Rationale: increase ROM, increase strength, improve coordination, improve balance and increase proprioception  to improve the patients ability to self-manage current condition in home environment    With   [x] TE   [] TA   [] Neuro   [x] SC   [] other: Patient Education: [x] Review HEP    [] Progressed/Changed HEP based on:   [] positioning   [] body mechanics   [] transfers   [] heat/ice application    [] other:      Other Objective/Functional Measures: Patient ambulates with decreased tyron and intermittent use of AD, holding Hurrycane in R UE, requiring intermittent cues to maintain L arm swing versus holding into adduction/internal rotation/elbow flexion during ambulation     Pain Level (0-10 scale) post treatment: 0/10    ASSESSMENT/Changes in Function:    The patient presented with her son and tolerated therapy visit well at this date. Patient demonstrates significant difficulty rising from lowered mat table during sit <-> stand transfers without use of UEs, improved with repetition and emphasis on decreased LE adduction/cues to maintain \"nose over toes\". Patient initially requiring multiple attempts and demonstrating bilateral knee valgus/decreased anterior weight shift to complete transfer. Patient requires intermittent prompts to improve use of L UE throughout session; emphasis on increasing use for balance and  with multiplanar step ups. Patient demonstrates increased use of vaulting strategy to achieve lateral > forward step up bilaterally, and noting intermittent LE buckling more frequent with fatigue. Demonstrating improved recruitment of LE extensors with cue to \"press step away from you\". Patient very motivated and demonstrating great patience in attempting to perform tasks appropriate to desired strategies; very fatigued at end of session today.  Continue to progress as tolerated. Patient will continue to benefit from skilled PT services to modify and progress therapeutic interventions, address functional mobility deficits, address ROM deficits, address strength deficits, analyze and address soft tissue restrictions, analyze and cue movement patterns, analyze and modify body mechanics/ergonomics and assess and modify postural abnormalities to attain remaining goals. [x]  See Plan of Care  []  See progress note/recertification  []  See Discharge Summary         Progress towards goals / Updated goals:    Short Term Goals: To be accomplished in 6-8 treatments:              TNJ patient will demonstrate independence with updated and progressive HEP to demonstrate increased participation with PT plan of care. - Progressing, discussed importance of appropriate dosage and intensity of HEP              The patient will perform Timed Up and Go in <= 13 seconds without assistive device (average of two trials) to demonstrate improved community ambulation potential. - Progressing  Long Term Goals: To be accomplished in 12-16 treatments:              The patient will demonstrate L  strength increased to >= 20 lbs. (average of two trials) to demonstrate improved ability to , lift, and type. - Continue goal              The patient will demonstrate L UE/LE strength increased by >= 1/2 MMT grade toward improved endurance and safety with functional tasks. - Progressing               The patient will score >= 60 on UE FOTO to demonstrate significantly improved subjective report of function with typing. - Progressing  Frequency / Duration: Patient to be seen 2 times per week for 4-8 treatments.     PLAN  [x]  Upgrade activities as tolerated     [x]  Continue plan of care  [x]  Update interventions per flow sheet       []  Discharge due to:_  []  Other:_      Eunice Marrero, PT, DPT 4/8/2021

## 2021-04-12 ENCOUNTER — APPOINTMENT (OUTPATIENT)
Dept: PHYSICAL THERAPY | Age: 81
End: 2021-04-12
Payer: MEDICARE

## 2021-04-15 ENCOUNTER — HOSPITAL ENCOUNTER (OUTPATIENT)
Dept: PHYSICAL THERAPY | Age: 81
Discharge: HOME | End: 2021-04-15
Payer: MEDICARE

## 2021-04-15 PROCEDURE — 97535 SELF CARE MNGMENT TRAINING: CPT

## 2021-04-15 PROCEDURE — 97110 THERAPEUTIC EXERCISES: CPT

## 2021-04-15 NOTE — PROGRESS NOTES
PT DAILY TREATMENT NOTE - [facility] 2-15    Patient Name: Sidney Perez  Date:4/15/2021  : 1940  [x]  Patient  Verified  [place]or[place] MED[place] / Plan: [place] MEDICARE PART A & B / Product Type: Medicare /    In time: 4285  Out time: 1150  Total Treatment Time (min): 76  Total Timed Codes (min): 76  1:1 Treatment Time ( only): 76   Visit #:  10    Treatment Area: Muscle weakness (generalized) [M62.81]    SUBJECTIVE  Pain Level (0-10 scale): 0/10  Any medication changes, allergies to medications, adverse drug reactions, diagnosis change, or new procedure performed?: [x] No    [] Yes (see summary sheet for update)  Subjective functional status/changes:   [] No changes reported    *Patient's son Tere Perez) present throughout therapy visit today*    The patient reports she took a hard fall on her back porch last Friday; she did not hit her head, however landed on L side and was attended to by her neighbor, who is an EMT. She notes increased difficulty opposing fingers and extending wrist since that time, however no sharp pains and able to bear weight through that arm; she notes EMT evaluated her for fracture and cleared her. She has continued to walk 1/4 mile with her son around her neighborhood trail, and to work daily on [employer]. OBJECTIVE    64 min Therapeutic Exercise:  [x] See flow sheet :   Rationale: increase ROM and increase strength to improve the patients ability to perform ADLs     12 min Self Care/Home Management:  []  See flow sheet : Discussed/educated patient and her son on importance of maintaining intensity of exercise program in home environment. Reviewed patient's written log of past few exercise days, with discussion of appropriate cardiovascular/fine motor/lower body strength challenges.  Continued to recommend patient target >= 20 minutes on stationary bike with minimal-moderate resistance to improve endurance and cardiovascular fitness toward improved ability to complete daily walk in home environment. Patient and her son verbalizing and demonstrating understanding of provided education with 100% accuracy at this date. Rationale: increase ROM, increase strength, improve coordination, improve balance and increase proprioception  to improve the patients ability to self-manage current condition in home environment    With   [x] TE   [] TA   [] Neuro   [x] SC   [] other: Patient Education: [x] Review HEP    [] Progressed/Changed HEP based on:   [] positioning   [] body mechanics   [] transfers   [] heat/ice application    [] other:      Other Objective/Functional Measures: Patient ambulates with decreased tyron and intermittent use of AD, holding Hurrycane in R UE, requiring intermittent cues to maintain L arm swing versus holding into adduction/internal rotation/elbow flexion during ambulation     Pain Level (0-10 scale) post treatment: 0/10    ASSESSMENT/Changes in Function:    The patient presented with her son and tolerated therapy visit well at this date. Patient demonstrates moderate difficulty rising from lowered mat table during sit <-> stand transfers without use of UEs, demonstrating increased use of momentum to achieve transfer. Noted improved concentric phase with repetition and emphasis on decreased LE adduction/cues to maintain \"nose over toes\". Patient initially requiring multiple attempts and demonstrating bilateral knee valgus/decreased anterior weight shift to complete transfer. Patient requires intermittent prompts to improve use of L UE throughout session; emphasis on increasing use for balance and  with multiplanar step ups. Patient demonstrates increased use of vaulting strategy to achieve lateral > forward step up bilaterally, however noting improved ability to push with LEs versus pull with UEs with fatigue and repetition.  Patient with mild losses of balance however able to regain position without requiring external support 100% of the time during standing reactionary perturbations. Patient demonstrates mild L wrist extension ROM loss and educated extensively on maintaining stretching/splinting strategies at home to avoid contractures. Patient very motivated and demonstrating great patience in attempting to perform tasks appropriate to desired strategies; very fatigued at end of session today. Continue to progress as tolerated. Patient will continue to benefit from skilled PT services to modify and progress therapeutic interventions, address functional mobility deficits, address ROM deficits, address strength deficits, analyze and address soft tissue restrictions, analyze and cue movement patterns, analyze and modify body mechanics/ergonomics and assess and modify postural abnormalities to attain remaining goals. [x]  See Plan of Care  []  See progress note/recertification  []  See Discharge Summary         Progress towards goals / Updated goals:    Short Term Goals: To be accomplished in 6-8 treatments:              IAV patient will demonstrate independence with updated and progressive HEP to demonstrate increased participation with PT plan of care. - Progressing, discussed importance of appropriate dosage and intensity of HEP              The patient will perform Timed Up and Go in <= 13 seconds without assistive device (average of two trials) to demonstrate improved community ambulation potential. - Progressing  Long Term Goals: To be accomplished in 12-16 treatments:              The patient will demonstrate L  strength increased to >= 20 lbs.  (average of two trials) to demonstrate improved ability to , lift, and type. - Continue goal              The patient will demonstrate L UE/LE strength increased by >= 1/2 MMT grade toward improved endurance and safety with functional tasks. - Progressing               The patient will score >= 60 on UE FOTO to demonstrate significantly improved subjective report of function with typing. - Progressing  Frequency / Duration: Patient to be seen 2 times per week for 4-8 treatments.     PLAN  [x]  Upgrade activities as tolerated     [x]  Continue plan of care  [x]  Update interventions per flow sheet       []  Discharge due to:_  []  Other:_      Maria Luz Rizzo, PT, DPT 4/15/2021

## 2021-04-19 ENCOUNTER — HOSPITAL ENCOUNTER (OUTPATIENT)
Dept: PHYSICAL THERAPY | Age: 81
Discharge: HOME | End: 2021-04-19
Payer: MEDICARE

## 2021-04-19 DIAGNOSIS — R41.3 DISTURBANCE OF MEMORY: ICD-10-CM

## 2021-04-19 DIAGNOSIS — M48.02 DEGENERATIVE CERVICAL SPINAL STENOSIS: ICD-10-CM

## 2021-04-19 DIAGNOSIS — R41.81 AGE-RELATED COGNITIVE DECLINE: Primary | ICD-10-CM

## 2021-04-19 PROCEDURE — 97112 NEUROMUSCULAR REEDUCATION: CPT

## 2021-04-19 PROCEDURE — 97535 SELF CARE MNGMENT TRAINING: CPT

## 2021-04-19 PROCEDURE — 97110 THERAPEUTIC EXERCISES: CPT

## 2021-04-19 NOTE — PROGRESS NOTES
PT DAILY TREATMENT NOTE - [facility] 2-15    Patient Name: Lynette Gallo  Date:2021  : 1940  [x]  Patient  Verified  [place] / Plan: [place] / Product Type: Medicare /    In time: 9350  Out time: 1215  Total Treatment Time (min): 72  Total Timed Codes (min): 72  1:1 Treatment Time ( only): 72   Visit #:  11    Treatment Area: Muscle weakness (generalized) [M62.81]    SUBJECTIVE  Pain Level (0-10 scale): 0/10  Any medication changes, allergies to medications, adverse drug reactions, diagnosis change, or new procedure performed?: [x] No    [] Yes (see summary sheet for update)  Subjective functional status/changes:   [] No changes reported    *Patient's son Tr Montoya) present throughout therapy visit today*    The patient reports she and her son feel she is continuing to get weaker and dizzier overall, particularly when standing or walking for longer periods of time; she notes that she fell twice yesterday () as her \"legs gave out\" and that she hit her head lightly one of those times (no apparent sequelae when further assessed). She and her son note more difficulty getting through 1/2 mile and typical biking bouts, and had to lower resistance. She reports increased dizziness when she exerts herself more, or when she turns. Additionally, she reports she feels her blood pressure increases when she is more anxious. OBJECTIVE    32 min Therapeutic Exercise:  [x] See flow sheet : Includes time spent discussing/practicing safety training via ambulating using FWW; stair ascent/descent using FWW. Patient verbalizing and demonstrating understanding of provided education with 100% accuracy at this date.    Rationale: increase ROM and increase strength to improve the patients ability to type and perform ADLs     30 min Self Care/Home Management:  []  See flow sheet : Discussed/educated patient and her son on importance of maintaining intensity of exercise program in home environment. Reviewed patient's written log of past few exercise days, with discussion of appropriate cardiovascular/fine motor/lower body strength challenges. Discussed/educated on potential role of occupational therapy (OT) services to improve overall therapeutic benefit by splitting upper/lower body/ambulation/postural control training. Patient and her son verbalizing and demonstrating understanding of provided education with 100% accuracy at this date. Rationale: increase ROM, increase strength, improve coordination, improve balance and increase proprioception  to improve the patients ability to self-manage current condition in home environment     10 min Neuromuscular Re-education:  [x]  See flow sheet :   Rationale: increase ROM, increase strength, improve coordination, improve balance and increase proprioception  to improve the patients ability to type and perform ADLs    With   [x] TE   [] TA   [x] Neuro   [x] SC   [] other: Patient Education: [x] Review HEP    [] Progressed/Changed HEP based on:   [] positioning   [] body mechanics   [] transfers   [] heat/ice application    [] other:      Other Objective/Functional Measures: Patient ambulates with decreased tyron and intermittent use of AD, holding Hurrycane in R UE, requiring intermittent cues to maintain L arm swing versus holding into adduction/internal rotation/elbow flexion during ambulation. Noted improvement in gait mechanics and ambulation speed with use of FWW. Pain Level (0-10 scale) post treatment: 0/10    ASSESSMENT/Changes in Function:    The patient presented with her son and tolerated therapy visit well at this date. Patient demonstrates minimal-moderate difficulty rising from lowered mat table during sit <-> stand transfers without use of UEs, demonstrating increased use of momentum to achieve transfer.  Patient introduced to front wheel walker today as a safety consideration related to increased frequency of recent falls; patient able to  appropriately on L and demonstrating increased stability and speed of ambulation/turns using FWW. Patient demonstrates increased lateral sway and excursion during ambulation with multiplanar head turns; challenged more with vertical > horizontal turns. Patient unable to use FWW behind her to push up safely to step due to decreased upper body strength today, improved with use of walker on top step. Patient requiring frequent and consistent cues to reach for mat table versus FWW when performing sit <-> stand transfers. Patient noting increased dizziness after multiplanar head turns and postural control training today, improved with seated rest break at end of session. Patient very motivated and demonstrating great patience in attempting to perform tasks appropriate to desired strategies; very fatigued at end of session today. Patient's referring physician in agreement to transfer patient to [address] [place] [address] for OT services, while continuing PT services through [facility], to allow concomitant treatment of holistic patient concerns at this time. Continue to progress as tolerated. Patient will continue to benefit from skilled PT services to modify and progress therapeutic interventions, address functional mobility deficits, address ROM deficits, address strength deficits, analyze and address soft tissue restrictions, analyze and cue movement patterns, analyze and modify body mechanics/ergonomics and assess and modify postural abnormalities to attain remaining goals.      [x]  See Plan of Care  []  See progress note/recertification  []  See Discharge Summary         Progress towards goals / Updated goals:    Short Term Goals: To be accomplished in 6-8 treatments:              IRP patient will demonstrate independence with updated and progressive HEP to demonstrate increased participation with PT plan of care. - Progressing, discussed importance of appropriate dosage and intensity of HEP              The patient will perform Timed Up and Go in <= 13 seconds without assistive device (average of two trials) to demonstrate improved community ambulation potential. - Progressing  Long Term Goals: To be accomplished in 12-16 treatments:              The patient will demonstrate L  strength increased to >= 20 lbs. (average of two trials) to demonstrate improved ability to , lift, and type. - Continue goal              The patient will demonstrate L UE/LE strength increased by >= 1/2 MMT grade toward improved endurance and safety with functional tasks. - Progressing               The patient will score >= 60 on UE FOTO to demonstrate significantly improved subjective report of function with typing. - Progressing  Frequency / Duration: Patient to be seen 2 times per week for 4-8 treatments.     PLAN  [x]  Upgrade activities as tolerated     [x]  Continue plan of care  [x]  Update interventions per flow sheet       []  Discharge due to:_  []  Other:_      Vinh Locke, PT, DPT 4/19/2021

## 2021-04-22 ENCOUNTER — HOSPITAL ENCOUNTER (OUTPATIENT)
Dept: PHYSICAL THERAPY | Age: 81
Discharge: HOME | End: 2021-04-22
Payer: MEDICARE

## 2021-04-22 PROCEDURE — 97116 GAIT TRAINING THERAPY: CPT

## 2021-04-22 PROCEDURE — 97535 SELF CARE MNGMENT TRAINING: CPT

## 2021-04-22 PROCEDURE — 97110 THERAPEUTIC EXERCISES: CPT

## 2021-04-22 NOTE — PROGRESS NOTES
PT DAILY TREATMENT NOTE - [facility] 2-15    Patient Name: José Luis Mace  Date:2021  : 1940  [x]  Patient  Verified  [place]or[place] MED[place] / Plan: [place] MED[place] PART A & B / Product Type: Medicare /    In time: 4870  Out time: 7477  Total Treatment Time (min): 51  Total Timed Codes (min): 51  1:1 Treatment Time ( only): 51   Visit #:  12    Treatment Area: Muscle weakness (generalized) [M62.81]    SUBJECTIVE  Pain Level (0-10 scale): 0/10  Any medication changes, allergies to medications, adverse drug reactions, diagnosis change, or new procedure performed?: [x] No    [] Yes (see summary sheet for update)  Subjective functional status/changes:   [] No changes reported    *Patient's son Migdalia Fontento) present throughout therapy visit today*    The patient and her son report she continues to have difficulty rising from a seated position such as her dining room chair or exiting her car, and she is relying more on her son for help. She feels her fall about a week ago increased difficulty with some of these tasks. She has continued to work diligently on HEP several times per day, and to walk 1/4-1/2 mile with her son daily, however notes increased fatigue to complete. She has been working on bridges, however does not like these because her knees hurt. Her son reports she has some L arm pain when putting on a coat which he attributes to decreased range of motion. OBJECTIVE    19 min Therapeutic Exercise:  [x] See flow sheet :   Rationale: increase ROM and increase strength to improve the patients ability to type and perform ADLs     12 min Self Care/Home Management:  []  See flow sheet : Discussed/educated patient and her son on importance of maintaining intensity of exercise program in home environment. Reviewed patient's written log of past few exercise days, with discussion of appropriate cardiovascular/fine motor/lower body strength challenges.  Continued to discuss/educate on potential role of occupational therapy (OT) services to improve overall therapeutic benefit by splitting upper/lower body/ambulation/postural control training. Patient and her son verbalizing and demonstrating understanding of provided education with 100% accuracy at this date. Rationale: increase ROM, increase strength, improve coordination, improve balance and increase proprioception  to improve the patients ability to self-manage current condition in home environment    20 min Gait Trainin' using front wheeled walker over uneven surfaces with CGA level of assist   Rationale: increase ROM, increase strength, improve coordination, improve balance and increase proprioception  to improve the patients ability to type and perform ADLs    With   [x] TE   [] TA   [] Neuro   [x] SC   [x] other: Patient Education: [x] Review HEP    [] Progressed/Changed HEP based on:   [] positioning   [] body mechanics   [] transfers   [] heat/ice application    [] other:      Other Objective/Functional Measures: Patient ambulates with decreased tyron and intermittent use of AD, holding Hurrycane in R UE, requiring intermittent cues to maintain L arm swing versus holding into adduction/internal rotation/elbow flexion during ambulation. Noted improvement in gait mechanics and ambulation speed with use of FWW. Pain Level (0-10 scale) post treatment: 0/10    ASSESSMENT/Changes in Function:    The patient presented with her son and tolerated therapy visit well at this date. Patient demonstrates intermittently moderate difficulty rising from lowered mat table during sit <-> stand transfers without use of UEs, demonstrating increased use of momentum to achieve transfer.  Initiated outdoor gait training using FWW today to improve independence and safety with ambulation during daily walks; patient able to  appropriately on L and demonstrating increased stability and speed of ambulation/turns using FWW, however demonstrating difficulty maintaining fluidity of motion and gait speed when changing surfaces, such as from rubber to asphalt. Patient requiring increased cues for sequencing and safety with curb descent using FWW. Patient requiring intermittent verbal and visual cues to reach for mat table versus FWW for safety when performing sit <-> stand transfers. Patient very motivated and demonstrating great patience in attempting to perform tasks appropriate to desired strategies; very fatigued at end of session today. Continue to progress as tolerated. Patient will continue to benefit from skilled PT services to modify and progress therapeutic interventions, address functional mobility deficits, address ROM deficits, address strength deficits, analyze and address soft tissue restrictions, analyze and cue movement patterns, analyze and modify body mechanics/ergonomics and assess and modify postural abnormalities to attain remaining goals. [x]  See Plan of Care  []  See progress note/recertification  []  See Discharge Summary         Progress towards goals / Updated goals:    Short Term Goals: To be accomplished in 6-8 treatments:              AWZ patient will demonstrate independence with updated and progressive HEP to demonstrate increased participation with PT plan of care. - Progressing, discussed importance of appropriate dosage and intensity of HEP              The patient will perform Timed Up and Go in <= 13 seconds without assistive device (average of two trials) to demonstrate improved community ambulation potential. - Progressing  Long Term Goals: To be accomplished in 12-16 treatments:              The patient will demonstrate L  strength increased to >= 20 lbs.  (average of two trials) to demonstrate improved ability to , lift, and type. - Continue goal              The patient will demonstrate L UE/LE strength increased by >= 1/2 MMT grade toward improved endurance and safety with functional tasks. - Progressing               The patient will score >= 60 on UE FOTO to demonstrate significantly improved subjective report of function with typing. - Progressing  Frequency / Duration: Patient to be seen 2 times per week for 4-8 treatments.     PLAN  [x]  Upgrade activities as tolerated     [x]  Continue plan of care  [x]  Update interventions per flow sheet       []  Discharge due to:_  []  Other:_      Nati James, PT, DPT 4/22/2021

## 2021-04-26 ENCOUNTER — HOSPITAL ENCOUNTER (OUTPATIENT)
Dept: PHYSICAL THERAPY | Age: 81
Discharge: HOME | End: 2021-04-26
Payer: MEDICARE

## 2021-04-26 DIAGNOSIS — I63.311 THROMBOTIC STROKE INVOLVING RIGHT MIDDLE CEREBRAL ARTERY (HCC): Primary | ICD-10-CM

## 2021-04-26 DIAGNOSIS — M48.02 DEGENERATIVE CERVICAL SPINAL STENOSIS: ICD-10-CM

## 2021-04-26 DIAGNOSIS — R41.3 DISTURBANCE OF MEMORY: ICD-10-CM

## 2021-04-26 PROCEDURE — 97140 MANUAL THERAPY 1/> REGIONS: CPT

## 2021-04-26 PROCEDURE — 97112 NEUROMUSCULAR REEDUCATION: CPT

## 2021-04-26 PROCEDURE — 97110 THERAPEUTIC EXERCISES: CPT

## 2021-04-26 PROCEDURE — 97535 SELF CARE MNGMENT TRAINING: CPT

## 2021-04-26 NOTE — PROGRESS NOTES
PT DAILY TREATMENT NOTE - [facility] 2-15    Patient Name: Katlyn Story  Date:2021  : 1940  [x]  Patient  Verified  Payor: [place] MED[place] / Plan: [place] MEDICARE PART A & B / Product Type: Medicare /    In time: 1716  Out time: 1205  Total Treatment Time (min): 62  Total Timed Codes (min): 62  1:1 Treatment Time ( only): 62   Visit #:  13    Treatment Area: Muscle weakness (generalized) [M62.81]    SUBJECTIVE  Pain Level (0-10 scale): 0/10  Any medication changes, allergies to medications, adverse drug reactions, diagnosis change, or new procedure performed?: [x] No    [] Yes (see summary sheet for update)  Subjective functional status/changes:   [] No changes reported    *Patient's son Wenda Habermann) present throughout therapy visit today*    The patient and her son report she continues to have increasing difficulty rising from a seated position, with need for assistance nearly all of the time. She nearly fell sitting down to park bench during her daily walk yesterday, however was able to catch herself on bench with L arm. She has remained very compliant with HEP since last visit, however worries over apparent regression. OBJECTIVE    22 min Therapeutic Exercise:  [x] See flow sheet : Includes time spent assessing blood pressure and educating patient and her son on blood pressure assessment and management in home environment   Rationale: increase ROM and increase strength to improve the patients ability to type and perform ADLs     20 min Self Care/Home Management:  []  See flow sheet : Discussed/educated patient and her son on importance of maintaining intensity of exercise program in home environment. Reviewed patient's written log of past few exercise days, with discussion of appropriate cardiovascular/fine motor/lower body strength challenges.  Continued to discuss/educate on potential role of occupational therapy (OT) services to improve overall therapeutic benefit by splitting upper/lower body/ambulation/postural control training. Patient and her son verbalizing and demonstrating understanding of provided education with 100% accuracy at this date. Rationale: increase ROM, increase strength, improve coordination, improve balance and increase proprioception  to improve the patients ability to self-manage current condition in home environment    10 min Neuromuscular Re-education:  [x]? See flow sheet :   Rationale: increase ROM, increase strength, improve coordination, improve balance and increase proprioception  to improve the patients ability to type and perform ADLs    10 min Manual Therapy: Progressive manual L finger extension/wrist extension/elbow extension stretching, low load long duration    Rationale: decrease pain, increase ROM, increase tissue extensibility, decrease trigger points and increase postural awareness to improve the patients ability to type and perform ADLs    With   [x] TE   [] TA   [x] Neuro   [x] SC   [] other: Patient Education: [x] Review HEP    [] Progressed/Changed HEP based on:   [] positioning   [] body mechanics   [] transfers   [] heat/ice application    [] other:      Other Objective/Functional Measures: Vitals = BP initially 164/114 using wrist cuff; re-taken with use of automatic cuff and /93. Patient reporting no symptoms throughout session today. Patient ambulates with decreased tyron and intermittent use of AD, holding Hurrycane in R UE, requiring intermittent cues to maintain L arm swing versus holding into adduction/internal rotation/elbow flexion during ambulation. Pain Level (0-10 scale) post treatment: 0/10    ASSESSMENT/Changes in Function:    The patient presented with her son and tolerated therapy visit moderately well at this date. Patient with initially elevated blood pressure using wrist cuff; noted mildly elevated with automatic cuff however patient asymptomatic and able to continue.  Patient demonstrates intermittently moderate difficulty rising from lowered mat table during sit <-> stand transfers without use of UEs, demonstrating increased use of momentum to achieve transfer. Noted improved ability to complete transfer without assistance with multimodal external cues for sequencing and use of postural control shifts to achieve upright position. Patient continues to demonstrate decreased awareness of L UE flexion posturing during ambulation and transfers, and demonstrates significant limitations in wrist extension PROM improved with manual stretching and self-management techniques for home environment. Patient very motivated and demonstrating great patience in attempting to perform tasks appropriate to desired strategies; very fatigued at end of session today. Continue to progress as tolerated; plan to transition to [facility] PT/OT services once referral is obtained to continue multidisciplinary care at higher intensity. Patient will continue to benefit from skilled PT services to modify and progress therapeutic interventions, address functional mobility deficits, address ROM deficits, address strength deficits, analyze and address soft tissue restrictions, analyze and cue movement patterns, analyze and modify body mechanics/ergonomics and assess and modify postural abnormalities to attain remaining goals.      [x]  See Plan of Care  []  See progress note/recertification  []  See Discharge Summary         Progress towards goals / Updated goals:    Short Term Goals: To be accomplished in 6-8 treatments:              HDL patient will demonstrate independence with updated and progressive HEP to demonstrate increased participation with PT plan of care. - Progressing, discussed importance of appropriate dosage and intensity of HEP              The patient will perform Timed Up and Go in <= 13 seconds without assistive device (average of two trials) to demonstrate improved community ambulation potential. - Progressing  Long Term Goals: To be accomplished in 12-16 treatments:              The patient will demonstrate L  strength increased to >= 20 lbs. (average of two trials) to demonstrate improved ability to , lift, and type. - Continue goal              The patient will demonstrate L UE/LE strength increased by >= 1/2 MMT grade toward improved endurance and safety with functional tasks. - Progressing               The patient will score >= 60 on UE FOTO to demonstrate significantly improved subjective report of function with typing. - Progressing  Frequency / Duration: Patient to be seen 2 times per week for 4-8 treatments.     PLAN  [x]  Upgrade activities as tolerated     [x]  Continue plan of care  [x]  Update interventions per flow sheet       []  Discharge due to:_  []  Other:_      Derrick Puente, PT, DPT 4/26/2021

## 2021-04-29 ENCOUNTER — HOSPITAL ENCOUNTER (OUTPATIENT)
Dept: PHYSICAL THERAPY | Age: 81
Discharge: HOME | End: 2021-04-29
Payer: MEDICARE

## 2021-04-29 PROCEDURE — 97116 GAIT TRAINING THERAPY: CPT

## 2021-04-29 PROCEDURE — 97110 THERAPEUTIC EXERCISES: CPT

## 2021-04-29 PROCEDURE — 97140 MANUAL THERAPY 1/> REGIONS: CPT

## 2021-04-29 PROCEDURE — 97535 SELF CARE MNGMENT TRAINING: CPT

## 2021-04-29 NOTE — PROGRESS NOTES
PT DAILY TREATMENT NOTE - [facility] 2-15    Patient Name: Shashi Dopp  Date:2021  : 1940  [x]  Patient  Verified  [place]or[place] / Plan: [place] MED[place] & B / Product Type: Medicare /    In time:   Out time: 1140  Total Treatment Time (min): 62  Total Timed Codes (min): 62  1:1 Treatment Time ( only): 62   Visit #:  14    Treatment Area: Muscle weakness (generalized) [M62.81]    SUBJECTIVE  Pain Level (0-10 scale): 0/10  Any medication changes, allergies to medications, adverse drug reactions, diagnosis change, or new procedure performed?: [x] No    [] Yes (see summary sheet for update)  Subjective functional status/changes:   [] No changes reported    *Patient's son Lucille Donovan) present throughout therapy visit today*    The patient and her son report she received new rolling walker in the mail Monday; she tried out at home and feels she expends quite a bit of energy to push, as front wheels swivel and she has to control this. OBJECTIVE    22 min Therapeutic Exercise:  [x] See flow sheet :   Rationale: increase ROM and increase strength to improve the patients ability to type and perform ADLs     10 min Self Care/Home Management:  []  See flow sheet : Discussed/educated patient and her son on importance of maintaining intensity of exercise program in home environment. Reviewed patient's written log of past few exercise days, with discussion of appropriate cardiovascular/fine motor/lower body strength challenges. Continued to discuss/educate on potential role of occupational therapy (OT) services to improve overall therapeutic benefit by splitting upper/lower body/ambulation/postural control training. Patient and her son verbalizing and demonstrating understanding of provided education with 100% accuracy at this date.    Rationale: increase ROM, increase strength, improve coordination, improve balance and increase proprioception  to improve the patients ability to self-manage current condition in home environment    20 min Gait Trainin' using front wheeled walker under each of the following conditions with (S)-CGA level of assist: original condition; seat raised; height lowered (two notches); increased pressure downward through UEs to control FWW; over red mat with ankle weights underneath to simulate unstable surfaces. Includes time spent discussing relative differences between conditions and recommendations for FWW use in home environment. Recommended patient and her son consider FWW without swiveling wheels to improve safety and endurance in home environment. Patient and her son verbalizing understanding of provided education at this date. Rationale: increase ROM, increase strength, improve coordination, improve balance and increase proprioception  to improve the patients ability to type, walk, and perform ADLs    10 min Manual Therapy: Progressive manual L finger extension/wrist extension/elbow extension stretching, low load long duration; retrograde edema massage along L digits 1-5   Rationale: decrease pain, increase ROM, increase tissue extensibility, decrease trigger points and increase postural awareness to improve the patients ability to type and perform ADLs    With   [x] TE   [] TA   [] Neuro   [x] SC   [x] other: gait training Patient Education: [x] Review HEP    [] Progressed/Changed HEP based on:   [] positioning   [] body mechanics   [] transfers   [] heat/ice application    [] other:      Other Objective/Functional Measures: Patient ambulates with increased tyron using FWW, requires intermittent cues to maintain positioning within AD     Pain Level (0-10 scale) post treatment: 0/10    ASSESSMENT/Changes in Function:    The patient presented with her son and tolerated therapy visit well at this date. Patient presents with increased edema throughout digits 1-5 limiting opposition and , and improved with retrograde edema massage.  Patient demonstrates intermittently moderate difficulty rising from lowered mat table during sit <-> stand transfers without use of UEs, demonstrating increased use of momentum to achieve transfer. Noted improved ability to complete transfer without assistance with multimodal external cues for sequencing and use of postural control shifts to achieve upright position, as well as use of UEs to assist with safety. Patient continues to demonstrate decreased awareness of L UE flexion posturing during ambulation and transfers, and demonstrates significant limitations in wrist extension PROM improved with manual stretching and self-management techniques for home environment. Patient demonstrating increased lateral excursion and difficulty controlling newly acquired FWW due to swiveling wheels over uneven > even surfaces; recommended patient and her son pursue rigid wheels for walker. Patient very motivated and demonstrating great patience in attempting to perform tasks appropriate to desired strategies; very fatigued at end of session today. Continue to progress as tolerated; plan to transition to [facility] PT/OT services once referral is obtained to continue multidisciplinary care at higher intensity. Patient will continue to benefit from skilled PT services to modify and progress therapeutic interventions, address functional mobility deficits, address ROM deficits, address strength deficits, analyze and address soft tissue restrictions, analyze and cue movement patterns, analyze and modify body mechanics/ergonomics and assess and modify postural abnormalities to attain remaining goals.      [x]  See Plan of Care  []  See progress note/recertification  []  See Discharge Summary         Progress towards goals / Updated goals:    Short Term Goals: To be accomplished in 6-8 treatments:              XTM patient will demonstrate independence with updated and progressive HEP to demonstrate increased participation with PT plan of care. - Progressing, discussed importance of appropriate dosage and intensity of HEP              The patient will perform Timed Up and Go in <= 13 seconds without assistive device (average of two trials) to demonstrate improved community ambulation potential. - Progressing  Long Term Goals: To be accomplished in 12-16 treatments:              The patient will demonstrate L  strength increased to >= 20 lbs. (average of two trials) to demonstrate improved ability to , lift, and type. - Continue goal              The patient will demonstrate L UE/LE strength increased by >= 1/2 MMT grade toward improved endurance and safety with functional tasks. - Progressing               The patient will score >= 60 on UE FOTO to demonstrate significantly improved subjective report of function with typing. - Progressing  Frequency / Duration: Patient to be seen 2 times per week for 4-8 treatments.     PLAN  [x]  Upgrade activities as tolerated     [x]  Continue plan of care  [x]  Update interventions per flow sheet       []  Discharge due to:_  []  Other:_      Cale Milton PT, DPT 4/29/2021

## 2021-05-04 ENCOUNTER — HOSPITAL ENCOUNTER (OUTPATIENT)
Dept: PHYSICAL THERAPY | Age: 81
Discharge: HOME | End: 2021-05-04
Payer: MEDICARE

## 2021-05-04 PROCEDURE — 97535 SELF CARE MNGMENT TRAINING: CPT

## 2021-05-04 PROCEDURE — 97110 THERAPEUTIC EXERCISES: CPT

## 2021-05-04 NOTE — ANCILLARY DISCHARGE INSTRUCTIONS
[address] Physical Therapy  [address] ([address]), [address] [place]  Phone: [phone] Fax: [phone]    Discharge Summary 2-15    Patient name: Vilma Urbina  : 1940  Provider#: [id number]  Referral source: Dianne Savage MD      Medical/Treatment Diagnosis: Muscle weakness (generalized) [M62.81]     Prior Hospitalization: see medical history     Comorbidities: See Plan of Care  Prior Level of Function: See Plan of Care  Medications: Verified on Patient Summary List    Start of Care: 21       Onset Date: 21   Visits from Start of Care: 15     Missed Visits: 2  Reporting Period : 21 to 21    Assessment/Summary of care: The patient is an [de-identified]year old female who has participated in 13 skilled physical therapy visits due to 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 She demonstrates continued L > R strength and coordination impairments, with concern over noted increased UE edema, decreasing ability to isolate L UE digits 4-5 during fine motor tasks, decreased ability to extend and isolate L UE movements, decreased ability to complete transfers independently and without UE support (unable to attempt 30 Second Sit to Stand Test without use of UEs as previously), and requiring progression of assistive device due to safety concerns related to continued falls. She demonstrates minimal progress with L  strength since initial evaluation, and demonstrates decreasing R  strength at re-evaluation today.  She performed Timed Up and Go in 23 seconds today, requiring increased time and assistance to complete test. The patient has met 1/5 goals for physical therapy services at this time. Patient and her son benefited from education as to appropriate direction of therapy services with increased emphasis on LE strengthening in functional positions to improve safety and independence. She would benefit from continued skilled physical therapy services to improve L UE fine motor skills, postural control, and functional UE/LE strength to improve quality of life; plan to transition to [facility] PT/OT services at this time to continue multidisciplinary care at higher intensity and maximize further gains. Short Term Goals: To be accomplished in 6-8 treatments:              CJJ patient will demonstrate independence with updated and progressive HEP to demonstrate increased participation with PT plan of care. - Met              The patient will perform Timed Up and Go in <= 13 seconds without assistive device (average of two trials) to demonstrate improved community ambulation potential. - Not Met  Long Term Goals: To be accomplished in 12-16 treatments:              The patient will demonstrate L  strength increased to >= 20 lbs.  (average of two trials) to demonstrate improved ability to , lift, and type. - Not Met              The patient will demonstrate L UE/LE strength increased by >= 1/2 MMT grade toward improved endurance and safety with functional tasks. - Not Met               The patient will score >= 60 on UE FOTO to demonstrate significantly improved subjective report of function with typing. - Not Met    RECOMMENDATIONS:  [x]Discontinue therapy: []Patient has reached or is progressing toward set goals     []Patient is non-compliant or has abdicated     [x]Due to lack of appreciable progress towards set goals; patient to transfer to facility where she can receive multidisciplinary PT/OT services at higher outpatient intensity to maximize gains at this time     []Other    Terrie Cho, PT, DPT 5/4/2021

## 2021-05-04 NOTE — PROGRESS NOTES
PT DAILY TREATMENT NOTE - [facility] 2-15    Patient Name: Harjinder Bliss  Date:2021  : 1940  [x]  Patient  Verified  Payor: [place] MEDICARE / Plan: [place] MEDICARE PART A & B / Product Type: Medicare /    In time: 2562  Out time: 1050  Total Treatment Time (min): 59  Total Timed Codes (min): 59  1:1 Treatment Time (MC only): 61   Visit #:  15    Treatment Area: Muscle weakness (generalized) [M62.81]    SUBJECTIVE  Pain Level (0-10 scale): 0/10  Any medication changes, allergies to medications, adverse drug reactions, diagnosis change, or new procedure performed?: [x] No    [] Yes (see summary sheet for update)  Subjective functional status/changes:   [] No changes reported    *Patient's son Vladimir Washington) present throughout therapy visit today*    The patient and her son report she has been working less on HEP due to other commitments, however Jairo Mckeon has been helping with daily L arm stretching (she notes to point of discomfort). She pedaled on bike for 20 minutes yesterday, and had difficulty getting off bike using R foot, which concerns her and Jairo Mckeon as this is her uninvolved side. She fell doing lateral step ups with R UE on handrail yesterday, and fell going down step this morning. Sleeping with L UE elevated has helped manage her swelling, however returns when she is up for any period of time. Her son has noticed that she has L hand tremors in the kitchen occasionally. She continues to have difficulty walking around the local track even a quarter of a mile.      OBJECTIVE    Other Observations: Patient requires increased time to complete transfers throughout evaluation, able to complete without assistive device over even surfaces  Gait and Functional Mobility: Patient uses rollator to ambulate, demonstrates increased tyron, decreased stance time on L, mild L knee flexion, decreased L hip extension during stance phase, maintains L UE in flexed/adducted/IR position (able to self-correct to increase L arm swing moderately with cues)     LOWER QUARTER                            MUSCLE STRENGTH  KEY                                                                             R                      L  0 - No Contraction                   Knee ext                      4                    4+  1 - Trace                                           flex                     4+                    4-  2 - Poor                                   Hip QQN                         3                      3+  3 - Fair                                           flex                         4                      4  4 - Good                                        abd                        5                      4  5 - Normal                                     add                        4                      4                                                  SIVSY DF                     4+                    4                                                            BJ                     4                      6     UPPER QUARTER                             MUSCLE STRENGTH  KEY                                                                             R                      L  0 - No Contraction                               Flexion             5                      4  1 - Trace                                                2 - Poor                                               Abduction        5                      4  3 - Fair                                                 IR                     5                      5  4 - Good                                              ER                   5                      4+  5 - Normal                                           Thumb Abd      4                      3+                                                              Finger Abd       4                       2         Strength:     R = 20, 23 lbs; average 21.5 lbs     L = 5, 5 lbs; average 5 lbs     Fine Motor: Patient demonstrates ability to oppose digits 1-3 at this time; continues to demonstrate decreased ability to oppose 4-5th digit and increased L hand edema    Reflexes/Sensation: Dermatomes WNL to light touch along bilateral LEs; noted patient hyperreflexive (3+) bilaterally at patellar tendon, 1+ bilaterally at Achilles tendon              Timed Up and Go (no assistive device): 21 seconds, 25 seconds; average = 23 seconds  30 Second Sit to Stand: 7 times (patient attempting multiple times to achieve concentric phase from standard chair without use of UEs; required use of UEs to complete test at this time)     30 min Therapeutic Exercise:  [x] See flow sheet : Includes time spent on re-assessment tests and measures   Rationale: increase ROM and increase strength to improve the patients ability to type and perform ADLs     29 min Self Care/Home Management:  []  See flow sheet : Discussed/educated patient and her son on importance of maintaining intensity of exercise program in home environment. Continued to discuss/educate on potential role of occupational therapy (OT) services to improve overall therapeutic benefit by splitting upper/lower body/ambulation/postural control training, as well as relaying potential benefits of increased rehabilitation intensity based on environment for maximizing patient's gains; recommending transfer of services to [facility] to obtain both PT/OT services at this time. Patient and her son verbalizing and demonstrating understanding of and agreement with provided education with 100% accuracy using teach back method.    Rationale: increase ROM, increase strength, improve coordination, improve balance and increase proprioception  to improve the patients ability to self-manage current condition in home environment    With   [x] TE   [] TA   [] Neuro   [x] SC   [] other: Patient Education: [x] Review HEP    [] Progressed/Changed HEP based on:   [] positioning   [] body mechanics   [] transfers   [] heat/ice application    [] other:      Other Objective/Functional Measures: None noted     Pain Level (0-10 scale) post treatment: 0/10    ASSESSMENT/Changes in Function:    The patient is an [de-identified]year old female who has participated in 15 skilled physical therapy visits due to 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 She demonstrates continued L > R strength and coordination impairments, with concern over noted increased UE edema, decreasing ability to isolate L UE digits 4-5 during fine motor tasks, decreased ability to extend and isolate L UE movements, decreased ability to complete transfers independently and without UE support (unable to attempt 30 Second Sit to Stand Test without use of UEs as previously), and requiring progression of assistive device due to safety concerns related to continued falls. She demonstrates minimal progress with L  strength since initial evaluation, and demonstrates decreasing R  strength at re-evaluation today. She performed Timed Up and Go in 23 seconds today, requiring increased time and assistance to complete test. The patient has met 1/5 goals for physical therapy services at this time. Patient and her son benefited from education as to appropriate direction of therapy services with increased emphasis on LE strengthening in functional positions to improve safety and independence. She would benefit from continued skilled physical therapy services to improve L UE fine motor skills, postural control, and functional UE/LE strength to improve quality of life; plan to transition to [facility] PT/OT services at this time to continue multidisciplinary care at higher intensity and maximize further gains.   Patient will continue to benefit from skilled PT services to modify and progress therapeutic interventions, address functional mobility deficits, address ROM deficits, address strength deficits, analyze and address soft tissue restrictions, analyze and cue movement patterns, analyze and modify body mechanics/ergonomics and assess and modify postural abnormalities to attain remaining goals. []  See Plan of Care  []  See progress note/recertification  [x]  See Discharge Summary         Progress towards goals / Updated goals:    Short Term Goals: To be accomplished in 6-8 treatments:              VAA patient will demonstrate independence with updated and progressive HEP to demonstrate increased participation with PT plan of care. - Met              The patient will perform Timed Up and Go in <= 13 seconds without assistive device (average of two trials) to demonstrate improved community ambulation potential. - Not Met  Long Term Goals: To be accomplished in 12-16 treatments:              The patient will demonstrate L  strength increased to >= 20 lbs.  (average of two trials) to demonstrate improved ability to , lift, and type. - Not Met              The patient will demonstrate L UE/LE strength increased by >= 1/2 MMT grade toward improved endurance and safety with functional tasks. - Not Met               The patient will score >= 60 on UE FOTO to demonstrate significantly improved subjective report of function with typing. - Not Met    PLAN  []  Upgrade activities as tolerated     []  Continue plan of care  []  Update interventions per flow sheet       [x]  Discharge due to: Patient demonstrates minimal progress toward PT goals at this time; would benefit from addition of OT services and increased intensity of PT/OT rehabilitation services and plan to transfer services to [address] [place] [address] to increase rehabilitation options at this time  []  Other:_      Terrie Cho, PT, DPT 5/4/2021

## 2021-05-06 ENCOUNTER — APPOINTMENT (OUTPATIENT)
Dept: PHYSICAL THERAPY | Age: 81
End: 2021-05-06
Payer: MEDICARE

## 2021-05-17 ENCOUNTER — OFFICE VISIT (OUTPATIENT)
Dept: NEUROLOGY | Age: 81
End: 2021-05-17
Payer: MEDICARE

## 2021-05-17 VITALS
BODY MASS INDEX: 20.24 KG/M2 | DIASTOLIC BLOOD PRESSURE: 82 MMHG | RESPIRATION RATE: 12 BRPM | HEIGHT: 62 IN | SYSTOLIC BLOOD PRESSURE: 126 MMHG | WEIGHT: 110 LBS | OXYGEN SATURATION: 96 % | HEART RATE: 100 BPM

## 2021-05-17 DIAGNOSIS — G81.14 LEFT SPASTIC HEMIPLEGIA (HCC): ICD-10-CM

## 2021-05-17 DIAGNOSIS — R41.3 DISTURBANCE OF MEMORY: Primary | ICD-10-CM

## 2021-05-17 DIAGNOSIS — G12.21 AMYOTROPHIC LATERAL SCLEROSIS (ALS) (HCC): ICD-10-CM

## 2021-05-17 PROCEDURE — 1090F PRES/ABSN URINE INCON ASSESS: CPT | Performed by: PSYCHIATRY & NEUROLOGY

## 2021-05-17 PROCEDURE — 1101F PT FALLS ASSESS-DOCD LE1/YR: CPT | Performed by: PSYCHIATRY & NEUROLOGY

## 2021-05-17 PROCEDURE — 99215 OFFICE O/P EST HI 40 MIN: CPT | Performed by: PSYCHIATRY & NEUROLOGY

## 2021-05-17 PROCEDURE — G8432 DEP SCR NOT DOC, RNG: HCPCS | Performed by: PSYCHIATRY & NEUROLOGY

## 2021-05-17 PROCEDURE — G8420 CALC BMI NORM PARAMETERS: HCPCS | Performed by: PSYCHIATRY & NEUROLOGY

## 2021-05-17 PROCEDURE — G8400 PT W/DXA NO RESULTS DOC: HCPCS | Performed by: PSYCHIATRY & NEUROLOGY

## 2021-05-17 PROCEDURE — G8536 NO DOC ELDER MAL SCRN: HCPCS | Performed by: PSYCHIATRY & NEUROLOGY

## 2021-05-17 PROCEDURE — G8427 DOCREV CUR MEDS BY ELIG CLIN: HCPCS | Performed by: PSYCHIATRY & NEUROLOGY

## 2021-05-17 RX ORDER — ASPIRIN 325 MG
325 TABLET, DELAYED RELEASE (ENTERIC COATED) ORAL DAILY
COMMUNITY

## 2021-05-17 NOTE — PROGRESS NOTES
Consult  REFERRED BY:  Audra Sarkar MD    CHIEF COMPLAINT: Left hand weakness and numbness for the last 4 weeks      Subjective:     Tong Sender is a [de-identified] y.o. right-handed  female we are seeing for evaluation of a new problem, of the patient having sudden left hand weakness and numbness and coordination problems and possibly even left leg weakness and that she tends to limp more on her left leg and slightly off balance and has to use a cane for ambulation. The patient was seen at [facility] emergency room and they told her she probably had a stroke, and was placed on a full dose aspirin and because it was a week ago by the time she got there she was not admitted apparently. She on February 4 2021 had an MRI scan at [facility] that was completely normal and I reviewed that personally myself, and do agree that it shows no lesions, suggesting a stroke that I could see. She also had no atrophy of the brain and no atrophy particularly in the left frontal speech areas. She had a carotid Doppler study that showed less than 50% disease bilaterally, no major stenosis. The patient denied any chest pain palpitations or cardiac symptoms associated with this event, denied any fever trauma or other disturbing causes, and was referred to us for further evaluation. She thinks she has had her cholesterol checked but not sure, and is not taking a statin now. Her only other medications include vitamin D and a multivitamin. She has no facial asymmetry on exam, and is generally hyperreflexic, but worse on the left side as compared to the right, but denies any neck pain or radicular pain. Her bowel and bladder function is okay and she denies any lower back pain.   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 She never had neuropsych testing. She has no family history of similar problems. The patient had an MRI scan of her cervical spine after the initial work-up as above, that showed some moderate arthritis but no clear cord compression or cause of her left hemiplegia, and she had neuropsych testing done because of some memory loss that showed age-related changes no associated dementia, and that seems to be about the same as far as the memory but the son noticing some trouble but the patient not having any trouble she is aware of. The patient is having progressive weakness on the left side, and more difficulty dressing herself now, returns for follow-up, and she sometimes has to use a cane and her son states that he thinks she is dragging her left leg now for the first time also. She does not seem to have any right-sided symptoms and no bulbar symptoms no difficulty with speech or swallowing, no double vision, no complaints of headache or neck pain or back pain, and no associated numbness, no cramping or muscle twitching seen, and no other systemic symptoms. History reviewed. No pertinent past medical history. Past Surgical History:   Procedure Laterality Date    HX APPENDECTOMY  65's    HX OTHER SURGICAL  1964    Hystoplexy     Family History   Problem Relation Age of Onset    Diabetes Mother     Kidney Disease Mother    Yeu Downy Stroke Father     Diabetes Sister     Heart Disease Sister         pacemaker    Diabetes Maternal Aunt     Heart Disease Maternal Uncle     Stroke Maternal Uncle     Diabetes Maternal Uncle     Parkinsonism Paternal Grandmother     Heart Disease Paternal Grandfather     Heart Disease Paternal Uncle     Diabetes Paternal Uncle       Social History     Tobacco Use    Smoking status: Never Smoker    Smokeless tobacco: Current User   Substance Use Topics    Alcohol use:  No Current Outpatient Medications:     aspirin delayed-release 325 mg tablet, Take 325 mg by mouth daily. , Disp: , Rfl:     cholecalciferol (Vitamin D3) 25 mcg (1,000 unit) cap, Take  by mouth daily. , Disp: , Rfl:     multivitamin, tx-iron-ca-min (THERA-M w/ IRON) 9 mg iron-400 mcg tab tablet, Take 1 Tab by mouth daily. , Disp: , Rfl:         No Known Allergies   MRI Results (most recent):  Results from Hospital Encounter encounter on 02/26/21   MRA BRAIN WO CONT    Narrative EXAM: MRA BRAIN WO CONT    INDICATION:   Dementia, memory disturbance. COMPARISON:  MRI brain 2/5/2021    CONTRAST:  None. TECHNIQUE:    3-D time-of-flight noncontrast MRA of the brain was performed. Multiplanar and  MIP reconstructions were obtained. FINDINGS:  There is no evidence of large vessel occlusion or flow-limiting stenosis of the  intracranial internal carotid, anterior cerebral, and middle cerebral arteries. The anterior communicating artery is patent. There is no evidence of large vessel occlusion or flow-limiting stenosis of the  intracranial vertebral arteries, basilar artery, or posterior cerebral arteries. The posterior communicating arteries are patent, right larger than left. There is no evidence of aneurysm or vascular malformation. Impression 1. No evidence of significant stenosis or aneurysm. Results from [place] encounter on 02/26/21   MRA BRAIN WO CONT    Narrative EXAM: MRA BRAIN WO CONT    INDICATION:   Dementia, memory disturbance. COMPARISON:  MRI brain 2/5/2021    CONTRAST:  None. TECHNIQUE:    3-D time-of-flight noncontrast MRA of the brain was performed. Multiplanar and  MIP reconstructions were obtained. FINDINGS:  There is no evidence of large vessel occlusion or flow-limiting stenosis of the  intracranial internal carotid, anterior cerebral, and middle cerebral arteries. The anterior communicating artery is patent.      There is no evidence of large vessel occlusion or flow-limiting stenosis of the  intracranial vertebral arteries, basilar artery, or posterior cerebral arteries. The posterior communicating arteries are patent, right larger than left. There is no evidence of aneurysm or vascular malformation. Impression 1. No evidence of significant stenosis or aneurysm. Review of Systems:  A comprehensive review of systems was negative except for: Musculoskeletal: positive for myalgias, arthralgias, stiff joints and muscle weakness  Neurological: positive for memory problems, speech problems, paresthesia, coordination problems, gait problems and weakness  Behvioral/Psych: positive for anxiety and depression   Vitals:    05/17/21 1038   BP: 126/82   Pulse: 100   Resp: 12   SpO2: 96%   Weight: 110 lb (49.9 kg)   Height: 5' 2\" (1.575 m)     Objective:     I      NEUROLOGICAL EXAM:    Appearance: The patient is thinly developed andl nourished, provides a fair history and is in no acute distress. Mental Status: Oriented to time, place and person, and the president, cognitive function is slow and mildly abnormal and speech is fluent and questionable mild aphasia but no dysarthria. Mood and affect appropriate. Cranial Nerves:   Intact visual fields. Fundi are benign, disc are flat, no lesions seen on funduscopy. LISSA, EOM's full, no nystagmus, no ptosis. Facial sensation is normal. Corneal reflexes are not tested. Facial movement is symmetric. Hearing is abnormal bilaterally. Palate is midline with normal sternocleidomastoid and trapezius muscles are normal. Tongue is midline. Neck without meningismus or bruits  Temporal arteries are not tender or enlarged  TMJ areas are not tender on palpation   Motor:  4/5 strength in upper and lower proximal and distal muscles on the right, and about 3/5 strength in the left leg and proximal arm, with left arm being 2/5 in the left hand1/5. [place]  Normal bulk and and tone on the right side and increased tone with slight reduced muscle bulk in the left hand. Mild fasciculations seen occasionally in the left first dorsal interosseous and in the left shoulder musculature, and possibly occasionally in the left lower leg. .  Rapid alternating movement is asymmetric and slow on the left foot and hand   Reflexes:   Deep tendon reflexes 2+/4 on the right and 3+/4 on the left  No babinski or clonus present   Sensory:   Normal to touch, pinprick and vibration and temperature. DSS is intact   Gait:  Abnormal gait with patient having a somewhat unsteady gait, with a clear left spastic hemiparetic gait. Tremor:   No tremor noted. Cerebellar:   Mildly abnormal cerebellar signs present on Romberg and tandem testing and finger-nose-finger exam.   Neurovascular:  Normal heart sounds and regular rhythm, peripheral pulses decreased and no carotid bruits. Assessment:       ICD-10-CM ICD-9-CM    1. Disturbance of memory  R41.3 780.93 REFERRAL TO NEUROLOGY      DAYNA COMPREHENSIVE PLUS PANEL      CK      IMMUNOELECTROPHORESIS (IMMUNOFIX.)      METABOLIC PANEL, COMPREHENSIVE   2. Amyotrophic lateral sclerosis (ALS) ([facility])  G12.21 335.20 REFERRAL TO NEUROLOGY      DAYNA COMPREHENSIVE PLUS PANEL      CK      IMMUNOELECTROPHORESIS (IMMUNOFIX.)      METABOLIC PANEL, COMPREHENSIVE   3. Left spastic hemiplegia ([facility])  G81.14 342.12 REFERRAL TO NEUROLOGY      DAYNA COMPREHENSIVE PLUS PANEL      CK      IMMUNOELECTROPHORESIS (IMMUNOFIX.)      METABOLIC PANEL, COMPREHENSIVE     Active Problems:    * No active hospital problems.  *      Plan:     Patient with progressive weakness on the left side, and now some atrophy of the hand musculature noted with what appears to be occasional fasciculations seen in the left first dorsal interosseous and maybe the abductor pollicis brevis and some in the left shoulder musculature and rarely in the left lower extremity, and questionably some in the right upper arm and maybe the right first dorsal interosseous raising the question of a probable motor neuron disease as a cause of her progressive spastic weakness, atypical and that it is so asymmetric  We discussed this with the son and the patient, and advised him that I think she may have a muscle disease or motor neuron disease as a cause of her symptoms, and that we need to have an EMG and nerve conduction study and ultrasound of her muscles looking for motor neuron disease as the most likely cause of her spastic progressive weakness. Patient did have some complaints of word finding speech difficulty, but her MRI scan does not show any frontal temporal atrophy to suggest frontotemporal dementia or primary progressive aphasia and her neuropsych testing was basically with normal aging, but 1 always wonders about ALS frontotemporal dementia complex, and she may be a candidate for the orf gene complex  We discussed her condition with the patient and her son in detail, and obviously she is high risk for further stroke neurologic deficit and dysfunction she has either a degenerative brain condition   They will check my chart for results of her test, and call us if there is any problem in the interim. Very difficult case, 45 minutes met with the patient and her son, and she is advised to continue the aspirin, and check with her PCP to see if they have had cholesterol levels checked because she may need a statin if it looks like this is more vascular. Metabolic studies also done to rule out any cause of her dementia like B12 levels, and connective tissue screen done to rule out arteritis or vasculitis as a cause of her symptoms also. Patient has very serious problem as she may have a progressive degenerative brain condition like ALS that is uniformly fatal and with marked disability and loss of function which she is already having, and we advised the son that we would fill out long-term care policy for her to help her get help at home.   Follow-up after the above-mentioned tests with Dr. Rufus Mariano, and 45 minutes spent with the patient and her son going over her work-up reviewing her records, examining her and extra careful detail exam and planning work-up as above.     Signed By: Jonna Shah MD     May 17, 2021       CC: Evin Hair MD  FAX: [phone]

## 2021-05-17 NOTE — LETTER
5/17/2021 Patient: Arlette Oppenheim YOB: 1940 Date of Visit: 5/17/2021 Liborio BansalSamson [id number] Via Fax: [phone] Dear Liborio Bansal MD, Thank you for referring Ms. Calixto Friedman to [address] [place] [address] for evaluation. My notes for this consultation are attached. Consult REFERRED BY: 
Lou Caicedo MD 
 
CHIEF COMPLAINT: Left hand weakness and numbness for the last 4 weeks Subjective:  
 
Arlette Oppenheim is a [de-identified] y.o. right-handed  female we are seeing for evaluation of a new problem, of the patient having sudden left hand weakness and numbness and coordination problems and possibly even left leg weakness and that she tends to limp more on her left leg and slightly off balance and has to use a cane for ambulation. The patient was seen at [facility] emergency room and they told her she probably had a stroke, and was placed on a full dose aspirin and because it was a week ago by the time she got there she was not admitted apparently. She on February 4 2021 had an MRI scan at [facility] [place] that was completely normal and I reviewed that personally myself, and do agree that it shows no lesions, suggesting a stroke that I could see. She also had no atrophy of the brain and no atrophy particularly in the left frontal speech areas. She had a carotid Doppler study that showed less than 50% disease bilaterally, no major stenosis. The patient denied any chest pain palpitations or cardiac symptoms associated with this event, denied any fever trauma or other disturbing causes, and was referred to us for further evaluation. She thinks she has had her cholesterol checked but not sure, and is not taking a statin now. Her only other medications include vitamin D and a multivitamin.   She has no facial asymmetry on exam, and is generally hyperreflexic, but worse on the left side as compared to the right, but denies any neck pain or radicular pain. Her bowel and bladder function is okay and she denies any lower back pain. 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 She never had neuropsych testing. She has no family history of similar problems. The patient had an MRI scan of her cervical spine after the initial work-up as above, that showed some moderate arthritis but no clear cord compression or cause of her left hemiplegia, and she had neuropsych testing done because of some memory loss that showed age-related changes no associated dementia, and that seems to be about the same as far as the memory but the son noticing some trouble but the patient not having any trouble she is aware of. The patient is having progressive weakness on the left side, and more difficulty dressing herself now, returns for follow-up, and she sometimes has to use a cane and her son states that he thinks she is dragging her left leg now for the first time also. She does not seem to have any right-sided symptoms and no bulbar symptoms no difficulty with speech or swallowing, no double vision, no complaints of headache or neck pain or back pain, and no associated numbness, no cramping or muscle twitching seen, and no other systemic symptoms. History reviewed. No pertinent past medical history. Past Surgical History:  
Procedure Laterality Date  HX APPENDECTOMY  1950's [address] Hy[address]xy Family History Problem Relation Age of Onset  Diabetes Mother  Kidney Disease Mother  Stroke Father  Diabetes Sister  Heart Disease Sister   
     pacemaker  Diabetes Maternal Aunt  Heart Disease Maternal Uncle  Stroke Maternal Uncle  Diabetes Maternal Uncle  Parkinsonism Paternal Grandmother  Heart Disease Paternal Grandfather  Heart Disease Paternal Uncle  Diabetes Paternal Uncle Social History Tobacco Use  Smoking status: Never Smoker  Smokeless tobacco: Current User Substance Use Topics  Alcohol use: No  
   
 
Current Outpatient Medications:  
  aspirin delayed-release 325 mg tablet, Take 325 mg by mouth daily. , Disp: , Rfl:  
  cholecalciferol (Vitamin D3) 25 mcg (1,000 unit) cap, Take  by mouth daily. , Disp: , Rfl:  
  multivitamin, tx-iron-ca-min (THERA-M w/ IRON) 9 mg iron-400 mcg tab tablet, Take 1 Tab by mouth daily. , Disp: , Rfl:  
 
 
 
No Known Allergies MRI Results (most recent): 
Results from [facility] Encounter encounter on 02/26/21 MRA BRAIN WO CONT Narrative EXAM: MRA BRAIN WO CONT INDICATION:   Dementia, memory disturbance. COMPARISON:  MRI brain 2/5/2021 CONTRAST:  None. TECHNIQUE:   
3-D time-of-flight noncontrast MRA of the brain was performed. Multiplanar and 
MIP reconstructions were obtained. FINDINGS: 
There is no evidence of large vessel occlusion or flow-limiting stenosis of the 
intracranial internal carotid, anterior cerebral, and middle cerebral arteries. The anterior communicating artery is patent. There is no evidence of large vessel occlusion or flow-limiting stenosis of the 
intracranial vertebral arteries, basilar artery, or posterior cerebral arteries. The posterior communicating arteries are patent, right larger than left. There is no evidence of aneurysm or vascular malformation. Impression 1. No evidence of significant stenosis or aneurysm. Results from [facility] Encounter encounter on 02/26/21 MRA BRAIN WO CONT Narrative EXAM: MRA BRAIN WO CONT INDICATION:   Dementia, memory disturbance. COMPARISON:  MRI brain 2/5/2021 CONTRAST:  None.  
 
TECHNIQUE:   
3-D time-of-flight noncontrast MRA of the brain was performed. Multiplanar and 
MIP reconstructions were obtained. FINDINGS: 
There is no evidence of large vessel occlusion or flow-limiting stenosis of the 
intracranial internal carotid, anterior cerebral, and middle cerebral arteries. The anterior communicating artery is patent. There is no evidence of large vessel occlusion or flow-limiting stenosis of the 
intracranial vertebral arteries, basilar artery, or posterior cerebral arteries. The posterior communicating arteries are patent, right larger than left. There is no evidence of aneurysm or vascular malformation. Impression 1. No evidence of significant stenosis or aneurysm. Review of Systems: A comprehensive review of systems was negative except for: Musculoskeletal: positive for myalgias, arthralgias, stiff joints and muscle weakness Neurological: positive for memory problems, speech problems, paresthesia, coordination problems, gait problems and weakness Behvioral/Psych: positive for anxiety and depression Vitals:  
 05/17/21 1038 BP: 126/82 Pulse: 100 Resp: 12 SpO2: 96% Weight: 110 lb (49.9 kg) Height: 5' 2\" (1.575 m) Objective: I 
 
 
NEUROLOGICAL EXAM: 
 
Appearance: The patient is thinly developed andl nourished, provides a fair history and is in no acute distress. Mental Status: Oriented to time, place and person, and the president, cognitive function is slow and mildly abnormal and speech is fluent and questionable mild aphasia but no dysarthria. Mood and affect appropriate. Cranial Nerves:   Intact visual fields. Fundi are benign, disc are flat, no lesions seen on funduscopy. LISSA, EOM's full, no nystagmus, no ptosis. Facial sensation is normal. Corneal reflexes are not tested. Facial movement is symmetric. Hearing is abnormal bilaterally. Palate is midline with normal sternocleidomastoid and trapezius muscles are normal. Tongue is midline.  
Neck without meningismus or bruits Temporal arteries are not tender or enlarged TMJ areas are not tender on palpation Motor:  4/5 strength in upper and lower proximal and distal muscles on the right, and about 3/5 strength in the left leg and proximal arm, with left arm being 2/5 in the left hand1/5. Orrie Bloom Normal bulk and and tone on the right side and increased tone with slight reduced muscle bulk in the left hand. Mild fasciculations seen occasionally in the left first dorsal interosseous and in the left shoulder musculature, and possibly occasionally in the left lower leg. Orrie Bloom Rapid alternating movement is asymmetric and slow on the left foot and hand Reflexes:   Deep tendon reflexes 2+/4 on the right and 3+/4 on the left No babinski or clonus present Sensory:   Normal to touch, pinprick and vibration and temperature. DSS is intact Gait:  Abnormal gait with patient having a somewhat unsteady gait, with a clear left spastic hemiparetic gait. Tremor:   No tremor noted. Cerebellar:   Mildly abnormal cerebellar signs present on Romberg and tandem testing and finger-nose-finger exam.  
Neurovascular:  Normal heart sounds and regular rhythm, peripheral pulses decreased and no carotid bruits. Assessment: ICD-10-CM ICD-9-CM 1. Disturbance of memory  R41.3 780.93 REFERRAL TO NEUROLOGY  
   DAYNA COMPREHENSIVE PLUS PANEL  
   CK IMMUNOELECTROPHORESIS (IMMUNOFIX.) METABOLIC PANEL, COMPREHENSIVE 2. Amyotrophic lateral sclerosis (ALS) ([facility])  G12.21 335.20 REFERRAL TO NEUROLOGY  
   DAYNA COMPREHENSIVE PLUS PANEL  
   CK IMMUNOELECTROPHORESIS (IMMUNOFIX.) METABOLIC PANEL, COMPREHENSIVE 3. Left spastic hemiplegia ([facility])  G81.14 342.12 REFERRAL TO NEUROLOGY  
   DAYNA COMPREHENSIVE PLUS PANEL  
   CK IMMUNOELECTROPHORESIS (IMMUNOFIX.) METABOLIC PANEL, COMPREHENSIVE Active Problems: * No active hospital problems.  * 
 
 
Plan:  
 
Patient with progressive weakness on the left side, and now some atrophy of the hand musculature noted with what appears to be occasional fasciculations seen in the left first dorsal interosseous and maybe the abductor pollicis brevis and some in the left shoulder musculature and rarely in the left lower extremity, and questionably some in the right upper arm and maybe the right first dorsal interosseous raising the question of a probable motor neuron disease as a cause of her progressive spastic weakness, atypical and that it is so asymmetric We discussed this with the son and the patient, and advised him that I think she may have a muscle disease or motor neuron disease as a cause of her symptoms, and that we need to have an EMG and nerve conduction study and ultrasound of her muscles looking for motor neuron disease as the most likely cause of her spastic progressive weakness. Patient did have some complaints of word finding speech difficulty, but her MRI scan does not show any frontal temporal atrophy to suggest frontotemporal dementia or primary progressive aphasia and her neuropsych testing was basically with normal aging, but 1 always wonders about ALS frontotemporal dementia complex, and she may be a candidate for the orf gene complex We discussed her condition with the patient and her son in detail, and obviously she is high risk for further stroke neurologic deficit and dysfunction she has either a degenerative brain condition They will check my chart for results of her test, and call us if there is any problem in the interim. Very difficult case, 45 minutes met with the patient and her son, and she is advised to continue the aspirin, and check with her PCP to see if they have had cholesterol levels checked because she may need a statin if it looks like this is more vascular.  
Metabolic studies also done to rule out any cause of her dementia like B12 levels, and connective tissue screen done to rule out arteritis or vasculitis as a cause of her symptoms also. Patient has very serious problem as she may have a progressive degenerative brain condition like ALS that is uniformly fatal and with marked disability and loss of function which she is already having, and we advised the son that we would fill out long-term care policy for her to help her get help at home. Follow-up after the above-mentioned tests with Dr. Skip Boucher, and 45 minutes spent with the patient and her son going over her work-up reviewing her records, examining her and extra careful detail exam and planning work-up as above. Signed By: Michelle Her MD   
 May 17, 2021 CC: Sudeep Zaman MD 
FAX: [phone] If you have questions, please do not hesitate to call me. I look forward to following your patient along with you. Sincerely, Michelle Her MD

## 2021-05-21 ENCOUNTER — TELEPHONE (OUTPATIENT)
Dept: NEUROLOGY | Age: 81
End: 2021-05-21

## 2021-05-21 NOTE — TELEPHONE ENCOUNTER
I called and notified that I did not receive this and she will send out to me again to my attention.     Keeping this open to make sure I send records as well

## 2021-05-21 NOTE — TELEPHONE ENCOUNTER
----- Message from Alluring Logickezia chiu sent at 5/20/2021  4:07 PM EDT -----  Regarding: /Telephone     Caller's first and last name: Juan Manuel splmagdiel  Reason for call:calling to confirm orders were received   Callback required yes/no and why:Yes  Best contact number(s):[phone]  Details to clarify the request: Prosper Rayo is calling to see if the orders for patient to get  a left splint was received  and she also is requesting the last two office notes for the patient as well.

## 2021-05-25 ENCOUNTER — TELEPHONE (OUTPATIENT)
Dept: NEUROLOGY | Age: 81
End: 2021-05-25

## 2021-05-25 NOTE — TELEPHONE ENCOUNTER
----- Message from Cheli Aaron sent at 5/25/2021 11:51 AM EDT -----  Regarding: /telephone  Caller's first and last name: Sohail splmagdiel  Reason for call:calling to confirm orders were received   Callback required yes/no and why:Yes  Best contact number(s):[phone]  Details to clarify the request: Henrry Ba is calling to follow up regarding the orders for patient to get  a left wrist splint was received  and she also is requesting the last two office notes for the patient as well.     Cheli Aaron

## 2021-05-28 NOTE — TELEPHONE ENCOUNTER
I called and found that everything is completed and nothing further is required regarding this phone call

## 2021-06-08 ENCOUNTER — OFFICE VISIT (OUTPATIENT)
Dept: NEUROLOGY | Age: 81
End: 2021-06-08
Payer: MEDICARE

## 2021-06-08 DIAGNOSIS — G12.20 MOTOR NEURON DISEASE (HCC): ICD-10-CM

## 2021-06-08 PROCEDURE — 95912 NRV CNDJ TEST 11-12 STUDIES: CPT | Performed by: PSYCHIATRY & NEUROLOGY

## 2021-06-08 PROCEDURE — 95887 MUSC TST DONE W/N TST NONEXT: CPT | Performed by: PSYCHIATRY & NEUROLOGY

## 2021-06-08 PROCEDURE — 95886 MUSC TEST DONE W/N TEST COMP: CPT | Performed by: PSYCHIATRY & NEUROLOGY

## 2021-06-08 NOTE — PROCEDURES
ELECTRODIAGNOSTIC REPORT      Test Date:  2021    Patient: Kiersten Galicia : 1940 Physician: Dr Mony Drew   ID#:  SEX: Male Ref. Phys: Dr. Monika Bhatti     Patient History / Exam:    This is a pleasant 80year old female with progressive weakness. Her examination does reveal significant weakness in the left upper extremity with spasticity. She has diffuse hyperreflexia. EMG & NCV Findings:    Nerve conduction studies as listed below were normal for the left median sensory, radial sensory, superficial fibular sensory, sural sensory and ulnar sensory. The left fibular motor revealed a low normal amplitude. The left tibial motor revealed a mildly reduced amplitude. The left median motor and ulnar motor revealed a reduced amplitude with borderline slowing of the conduction velocity. Disposable concentric needle examination of the muscles listed below did reveal widespread active denervation throughout the left upper extremity, lower extremity, thoracic paraspinal, and cranial innervated muscles. Impression:    The study was abnormal.  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 ___________________________  Teodoro James D.O.  FAAN    Nerve Conduction Studies  Anti Sensory Summary Table     Stim Site NR Onset (ms) Peak (ms) O-P Amp (µV) Norm Peak (ms) Norm O-P Amp Site1 Site2 Dist (cm) Norm Nithin (m/s)   Left Median Anti Sensory (2nd Digit)   Wrist    2.6 3.1 14.1 <4 >11 Wrist 2nd Digit 14.0    Left Radial Anti Sensory (Base 1st Digit)   Wrist    1.8 2.3 22.3 <2.9 >15 Wrist Base 1st Digit 10.0    Left Sup Fibular Anti Sensory (Lat ankle)   Lower leg    2.3 3.0 5.0 <4.4 >5.0 Lower leg Lat ankle 10.0 >32   Left Sural Anti Sensory (Lat Mall)   Calf    3.2 3.9 4.8 <4.5 >4.0 Calf Lat Mall 14.0    Left Ulnar Anti Sensory (5th Digit)   Wrist    0.9 2.6 11.2 <4.0 >10 Wrist 5th Digit 14.0      Motor Summary Table     Stim Site NR Onset (ms) Norm Onset (ms) O-P Amp (mV) Norm O-P Amp P-T Amp (mV) Site1 Site2 Dist (cm) Nithin (m/s)   Left Fibular Motor (Ext Dig Brev)   Ankle    3.8 <6.5 1.1 >1.1  Ankle Ext Dig Brev 8.0 21   B Fib    10.6  0.7   B Fib Ankle 29.0 43   Poplt    12.6  1.1   Poplt B Fib 10.0 50   Left Median Motor (Abd Poll Brev)   Wrist    3.5 <4.5 2.0 >4.1  Wrist Abd Poll Brev 8.0 23   Elbow    7.3  1.3   Elbow Wrist 17.0 45   Left Tibial Motor (Abd Ayers Brev)   Ankle    4.8 <6.1 1.9 >4.4  Ankle Abd Ayers Brev 8.0 17   Left Ulnar Motor (Abd Dig Minimi)   Wrist    3.4 <3.1 0.8 >7.0  Wrist Abd Dig Minimi 8.0    B Elbow    6.7  0.6   B Elbow Wrist 16.0 48   A Elbow    8.8  0.6   A Elbow B Elbow 10.0 48     Comparison Summary Table     Stim Site NR Peak (ms) P-T Amp (µV) Site1 Site2 Dist (cm) Delta-P (ms)   Left Median/Ulnar Palm Comparison (Wrist)   Median Palm    1.8 25.4 Median Palm Ulnar Palm 8.0 0.2   Ulnar Palm    1.6 12.7           EMG     Side Muscle Nerve Root Ins Act Fibs Psw Recrt Duration Amp Poly Comment   Left Deltoid Axillary C5-6 Incr 2+ 2+ Reduced Incr Incr 1+    Left Triceps Radial C6-7-8 Incr 2+ 2+ Reduced Nml Nml Nml    Left Biceps Musculocut C5-6 Nml Nml Nml Nml Nml Nml Nml poor relaxation   Left 1stDorInt Ulnar C8-T1 Incr 3+ 3+ Reduced Nml Nml 1+    Left ExtDigCom   Incr 3+ 3+ Reduced Nml Nml 1+    Left AntTibialis Dp Br Peron L4-5 Incr 2+ 2+ Reduced Nml Nml 1+    Left MedGastroc Tibial S1-2 Incr 2+ 2+ Reduced Nml Nml Nml    Left Peroneus Long   Incr 2+ 2+ Nml Nml Nml Nml    Left VastusLat Femoral L2-4 Incr 2+ Nml Reduced Nml Nml Nml    Left Lower Lumb Parasp Rami L5,S1 Incr 2+ Nml Nml Nml Nml Nml    Left lLower Thoracic  T10T11 Incr 2+ 2+ Nml Nml Nml Nml    Left mentalis   Incr 2+ 2+ Nml Nml Nml Nml      Waveforms:

## 2021-06-16 ENCOUNTER — TELEPHONE (OUTPATIENT)
Dept: NEUROLOGY | Age: 81
End: 2021-06-16

## 2021-06-16 NOTE — TELEPHONE ENCOUNTER
Patients son would like to spk with the nurse regding his mother's health condition.  Son states that she is not doing so well

## 2021-06-16 NOTE — TELEPHONE ENCOUNTER
Permission to release gives me permission to speak with the son, but only to  meds. I called the patient's son.   The patient was put on an opening tomorrow with Dr. Bridgette Weems

## 2021-06-17 ENCOUNTER — OFFICE VISIT (OUTPATIENT)
Dept: NEUROLOGY | Age: 81
End: 2021-06-17
Payer: MEDICARE

## 2021-06-17 VITALS
SYSTOLIC BLOOD PRESSURE: 120 MMHG | BODY MASS INDEX: 17.85 KG/M2 | OXYGEN SATURATION: 96 % | DIASTOLIC BLOOD PRESSURE: 80 MMHG | HEART RATE: 90 BPM | WEIGHT: 97 LBS | HEIGHT: 62 IN

## 2021-06-17 DIAGNOSIS — G12.21 AMYOTROPHIC LATERAL SCLEROSIS (ALS) (HCC): Primary | ICD-10-CM

## 2021-06-17 PROCEDURE — 1090F PRES/ABSN URINE INCON ASSESS: CPT | Performed by: PSYCHIATRY & NEUROLOGY

## 2021-06-17 PROCEDURE — G8400 PT W/DXA NO RESULTS DOC: HCPCS | Performed by: PSYCHIATRY & NEUROLOGY

## 2021-06-17 PROCEDURE — G8432 DEP SCR NOT DOC, RNG: HCPCS | Performed by: PSYCHIATRY & NEUROLOGY

## 2021-06-17 PROCEDURE — G8419 CALC BMI OUT NRM PARAM NOF/U: HCPCS | Performed by: PSYCHIATRY & NEUROLOGY

## 2021-06-17 PROCEDURE — 99215 OFFICE O/P EST HI 40 MIN: CPT | Performed by: PSYCHIATRY & NEUROLOGY

## 2021-06-17 PROCEDURE — G8427 DOCREV CUR MEDS BY ELIG CLIN: HCPCS | Performed by: PSYCHIATRY & NEUROLOGY

## 2021-06-17 PROCEDURE — 1101F PT FALLS ASSESS-DOCD LE1/YR: CPT | Performed by: PSYCHIATRY & NEUROLOGY

## 2021-06-17 PROCEDURE — G8536 NO DOC ELDER MAL SCRN: HCPCS | Performed by: PSYCHIATRY & NEUROLOGY

## 2021-06-17 RX ORDER — RILUZOLE 50 MG/1
50 TABLET, FILM COATED ORAL EVERY 12 HOURS
Qty: 60 TABLET | Refills: 11 | Status: SHIPPED | OUTPATIENT
Start: 2021-06-17

## 2021-06-17 NOTE — PROGRESS NOTES
Consult  REFERRED BY:  Ashia Sifuentes MD    CHIEF COMPLAINT: Left hand weakness and numbness for the last 4 weeks      Subjective:     Kelsea Mancera is a 80 y.o. right-handed  female we are seeing for evaluation at the request of Dr. Goran Adame of a new problem of the patient having a recent EMG study done by Dr. Doreen Franco that did document that she does have a ALS as expected at her last visit, and she is seen for follow-up to go over her diagnosis with treatment options etc.  The patient is increasingly weaker, having more difficulty walking because of left leg weakness, and left arm weakness, and was seen at [facility] in February and thought to have a stroke, but MRI scans were negative and she still getting PT and OT. She is having more difficulty swallowing, and has a vague double vision and was found on OT today, mainly on near vision and convergence. She is having more difficulty swallowing has to use smaller bites, and having some dysphagia so we will send her to speech therapy for evaluation and possible modified barium swallow, but she wants no feeding tubes and no DNR. She seems be progressing fairly rapidly having more difficulty with shortness of breath so was sent to Dr. Merry Desai from pulmonary to see if he can help in any way besides being on a ventilator, but she does not want. We will have hospice see her because she is a DNR and I am not sure she is going last very long. She will also be seen by the ALS clinic. She needs help at home with hospital bed, wheelchair, and even some sliding board for her toe and  bars for the bathroom. She on February 4 2021 had an MRI scan at [facility] that was completely normal and I reviewed that personally myself, and do agree that it shows no lesions, suggesting a stroke that I could see. She also had no atrophy of the brain and no atrophy particularly in the left frontal speech areas.   She had a carotid Doppler study that showed less than 50% disease bilaterally, no major stenosis. Her only other medications include vitamin D and a multivitamin. She has no facial asymmetry on exam, and is generally hyperreflexic, but worse on the left side as compared to the right, but denies any neck pain or radicular pain. Her bowel and bladder function is okay and she denies any lower back pain. 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 She never had neuropsych testing. She has no family history of similar problems. The patient had an MRI scan of her cervical spine after the initial work-up as above, that showed some moderate arthritis but no clear cord compression or cause of her left hemiplegia, and she had neuropsych testing done because of some memory loss that showed age-related changes no associated dementia, and that seems to be about the same as far as the memory but the son noticing some trouble but the patient not having any trouble she is aware of. The patient is having progressive weakness on the left side, and more difficulty dressing herself now, returns for follow-up, and she sometimes has to use a cane and her son states that he thinks she is dragging her left leg now for the first time also. She does not seem to have any right-sided symptoms and no bulbar symptoms no difficulty with speech or swallowing, no double vision, no complaints of headache or neck pain or back pain, and no associated numbness, no cramping or muscle twitching seen, and no other systemic symptoms. No past medical history on file.    Past Surgical History:   Procedure Laterality Date    HX APPENDECTOMY  1950's    HX OTHER SURGICAL  1964    Hystoplexy     Family History   Problem Relation Age of Onset    Diabetes Mother     Kidney Disease Mother     Stroke Father     Diabetes Sister     Heart Disease Sister         pacemaker    Diabetes Maternal Aunt     Heart Disease Maternal Uncle     Stroke Maternal Uncle     Diabetes Maternal Uncle     Parkinsonism Paternal Grandmother     Heart Disease Paternal Grandfather     Heart Disease Paternal Uncle     Diabetes Paternal Uncle       Social History     Tobacco Use    Smoking status: Never Smoker    Smokeless tobacco: Current User   Substance Use Topics    Alcohol use: No         Current Outpatient Medications:     riluzole (Rilutek) tablet, Take 1 Tablet by mouth every twelve (12) hours. , Disp: 60 Tablet, Rfl: 11    aspirin delayed-release 325 mg tablet, Take 325 mg by mouth daily. , Disp: , Rfl:     cholecalciferol (Vitamin D3) 25 mcg (1,000 unit) cap, Take  by mouth daily. , Disp: , Rfl:     multivitamin, tx-iron-ca-min (THERA-M w/ IRON) 9 mg iron-400 mcg tab tablet, Take 1 Tab by mouth daily. , Disp: , Rfl:         No Known Allergies   MRI Results (most recent):  Results from Hospital Encounter encounter on 02/26/21    MRA BRAIN WO CONT    Narrative  EXAM: MRA BRAIN WO CONT    INDICATION:   Dementia, memory disturbance. COMPARISON:  MRI brain 2/5/2021    CONTRAST:  None. TECHNIQUE:  3-D time-of-flight noncontrast MRA of the brain was performed. Multiplanar and  MIP reconstructions were obtained. FINDINGS:  There is no evidence of large vessel occlusion or flow-limiting stenosis of the  intracranial internal carotid, anterior cerebral, and middle cerebral arteries. The anterior communicating artery is patent. There is no evidence of large vessel occlusion or flow-limiting stenosis of the  intracranial vertebral arteries, basilar artery, or posterior cerebral arteries. The posterior communicating arteries are patent, right larger than left.     There is no evidence of aneurysm or vascular malformation. Impression  1. No evidence of significant stenosis or aneurysm. Results from [place] encounter on 02/26/21    MRA BRAIN WO CONT    Narrative  EXAM: MRA BRAIN WO CONT    INDICATION:   Dementia, memory disturbance. COMPARISON:  MRI brain 2/5/2021    CONTRAST:  None. TECHNIQUE:  3-D time-of-flight noncontrast MRA of the brain was performed. Multiplanar and  MIP reconstructions were obtained. FINDINGS:  There is no evidence of large vessel occlusion or flow-limiting stenosis of the  intracranial internal carotid, anterior cerebral, and middle cerebral arteries. The anterior communicating artery is patent. There is no evidence of large vessel occlusion or flow-limiting stenosis of the  intracranial vertebral arteries, basilar artery, or posterior cerebral arteries. The posterior communicating arteries are patent, right larger than left. There is no evidence of aneurysm or vascular malformation. Impression  1. No evidence of significant stenosis or aneurysm. Review of Systems:  A comprehensive review of systems was negative except for: Musculoskeletal: positive for myalgias, arthralgias, stiff joints and muscle weakness  Neurological: positive for memory problems, speech problems, paresthesia, coordination problems, gait problems and weakness  Behvioral/Psych: positive for anxiety and depression   Vitals:    06/17/21 1122   BP: 120/80   Pulse: 90   SpO2: 96%   Weight: 97 lb (44 kg)   Height: 5' 2\" (1.575 m)     Objective:     I      NEUROLOGICAL EXAM:    Appearance: The patient is thinly developed and nourished, provides a fair history and is in no acute distress. Mental Status: Oriented to time, place and person, and the president, cognitive function is slow and mildly abnormal and speech is fluent and questionable mild aphasia and has a mild dysarthria. Mood and affect appropriate. Cranial Nerves:   Intact visual fields.  Fundi are benign, disc are flat, no lesions seen on funduscopy. LISSA, EOM's full, but occasionally seems to have a slight skew deviation to the eyes. No nystagmus, no ptosis. Facial sensation is normal. Corneal reflexes are not tested. Facial movement is symmetric. Hearing is abnormal bilaterally. Palate is midline with normal sternocleidomastoid and trapezius muscles are normal. Tongue is midline. Neck without meningismus or bruits  Temporal arteries are not tender or enlarged  TMJ areas are not tender on palpation   Motor:  4/5 strength in upper and lower proximal and distal muscles on the right, and about 3/5 strength in the left leg and proximal arm, with left arm being 2/5 in the left hand1/5. Lor Haywood Normal bulk and and tone on the right side and increased tone with slight reduced muscle bulk in the left hand. Mild fasciculations seen occasionally in the left first dorsal interosseous and in the left shoulder musculature, and possibly occasionally in the left lower leg. .  Rapid alternating movement is asymmetric and slow on the left foot and hand   Reflexes:   Deep tendon reflexes 2+/4 on the right and 3+/4 on the left  No babinski or clonus present   Sensory:   Normal to touch, pinprick and vibration and temperature. DSS is intact   Gait:  Abnormal gait with patient having a somewhat unsteady gait, with a clear left spastic hemiparetic gait. Tremor:   No tremor noted. Cerebellar:   Mildly abnormal cerebellar signs present on Romberg and tandem testing and finger-nose-finger exam.   Neurovascular:  Normal heart sounds and regular rhythm, peripheral pulses decreased and no carotid bruits. Assessment:       ICD-10-CM ICD-9-CM    1. Amyotrophic lateral sclerosis (ALS) ([facility])  G12.21 335.20 REFERRAL TO SPEECH THERAPY      REFERRAL TO PULMONARY DISEASE      riluzole (Rilutek) tablet      REFERRAL TO HOSPICE     Active Problems:    * No active hospital problems.  *      Plan:     Patient with EMG study and does indeed suggest ALS, and the patient is progressing fairly rapidly, having difficulty with double vision, difficulty breathing some difficulty swallowing and wants to be a DNR and no feeding tubes we will send her to hospice care and contact the [facility] for a helping her stay at home and be managed in a palliative care way. We will send letters to the bank and her insurance to try to get things arranged so her son can handle her at home. She will need a hospital bed of is a hospice people can give her. Patient did have some complaints of word finding speech difficulty, but her MRI scan does not show any frontal temporal atrophy to suggest frontotemporal dementia or primary progressive aphasia and her neuropsych testing was basically with normal aging, but 1 always wonders about ALS frontotemporal dementia complex, and she may be a candidate for the orf gene complex  We discussed her condition with the patient and her son in detail, and obviously she is high risk for further stroke neurologic deficit and dysfunction she has either a degenerative brain condition   Very difficult case, 45 minutes met with the patient and her son, and we talked in detail about her diagnosis, prognosis, treatment options, and Dr. Ander Kelsey says she is not a candidate for Marcelene Able because she is probably already too far gone, we will try her on Rilutek 50 mg twice a day to see if that helps a little, she was referred to Dr. Jai Waldrop for her breathing problems. Again she was referred to the [facility] and hospice care because she does not want aggressive treatment. Metabolic studies also done to rule out any cause of her dementia like B12 levels, and connective tissue screen done to rule out arteritis or vasculitis as a cause of her symptoms also.   Patient has very serious problem as she has a progressive degenerative fatal brain condition like ALS that is uniformly fatal and with marked disability and loss of function which she is already having, and we advised the son that we would fill out long-term care policy for her to help her get help at home. 45 minutes spent with the patient and her son going over her work-up reviewing her records, examining her and extra careful detail exam and planning work-up as above. Follow-up in 6 weeks time or earlier as need be.     Signed By: Tyshawn Hemphill MD     June 17, 2021       CC: Fuentes Barrett MD  FAX: [phone]

## 2021-06-17 NOTE — LETTER
6/17/2021 1:23 PM 
 
Ms. Coleman Dad [address] [place] [facility] [id number] Mrs. 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 She is mentally still able to sign power of  forms, just physically disabled. If I can be of any further assistance please let me know, Sincerely, Liborio Etienne MD

## 2021-06-17 NOTE — LETTER
6/17/2021 Patient: Lisbeth Gonzalez YOB: 1940 Date of Visit: 6/17/2021 Liborio Bansal [place] [address] [address] Fax: [phone] Dear Liborio Bansal MD, Thank you for referring Ms. George Amin to [address] [place] [address] for evaluation. My notes for this consultation are attached. Consult REFERRED BY: 
Robin Frankel MD 
 
CHIEF COMPLAINT: Left hand weakness and numbness for the last 4 weeks Subjective:  
 
Lisbeth Gonzalez is a 80 y.o. right-handed  female we are seeing for evaluation at the request of Dr. Junior Edwards of a new problem of the patient having a recent EMG study done by Dr. Guillaume Higgins that did document that she does have a ALS as expected at her last visit, and she is seen for follow-up to go over her diagnosis with treatment options etc.  The patient is increasingly weaker, having more difficulty walking because of left leg weakness, and left arm weakness, and was seen at [facility] in February and thought to have a stroke, but MRI scans were negative and she still getting PT and OT. She is having more difficulty swallowing, and has a vague double vision and was found on OT today, mainly on near vision and convergence. She is having more difficulty swallowing has to use smaller bites, and having some dysphagia so we will send her to speech therapy for evaluation and possible modified barium swallow, but she wants no feeding tubes and no DNR. She seems be progressing fairly rapidly having more difficulty with shortness of breath so was sent to Dr. Jerome Horton from pulmonary to see if he can help in any way besides being on a ventilator, but she does not want. We will have hospice see her because she is a DNR and I am not sure she is going last very long. She will also be seen by the ALS clinic.   She needs help at home with hospital bed, wheelchair, and even some sliding board for her toe and  bars for the bathroom. She on February 4 2021 had an MRI scan at [facility] that was completely normal and I reviewed that personally myself, and do agree that it shows no lesions, suggesting a stroke that I could see. She also had no atrophy of the brain and no atrophy particularly in the left frontal speech areas. She had a carotid Doppler study that showed less than 50% disease bilaterally, no major stenosis. Her only other medications include vitamin D and a multivitamin. She has no facial asymmetry on exam, and is generally hyperreflexic, but worse on the left side as compared to the right, but denies any neck pain or radicular pain. Her bowel and bladder function is okay and she denies any lower back pain. 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 She never had neuropsych testing. She has no family history of similar problems. The patient had an MRI scan of her cervical spine after the initial work-up as above, that showed some moderate arthritis but no clear cord compression or cause of her left hemiplegia, and she had neuropsych testing done because of some memory loss that showed age-related changes no associated dementia, and that seems to be about the same as far as the memory but the son noticing some trouble but the patient not having any trouble she is aware of. The patient is having progressive weakness on the left side, and more difficulty dressing herself now, returns for follow-up, and she sometimes has to use a cane and her son states that he thinks she is dragging her left leg now for the first time also.   She does not seem to have any right-sided symptoms and no bulbar symptoms no difficulty with speech or swallowing, no double vision, no complaints of headache or neck pain or back pain, and no associated numbness, no cramping or muscle twitching seen, and no other systemic symptoms. No past medical history on file. Past Surgical History:  
Procedure Laterality Date  HX APPENDECTOMY  1950's [address] Hystoplexy Family History Problem Relation Age of Onset  Diabetes Mother  Kidney Disease Mother  Stroke Father  Diabetes Sister  Heart Disease Sister   
     pacemaker  Diabetes Maternal Aunt  Heart Disease Maternal Uncle  Stroke Maternal Uncle  Diabetes Maternal Uncle  Parkinsonism Paternal Grandmother  Heart Disease Paternal Grandfather  Heart Disease Paternal Uncle  Diabetes Paternal Uncle Social History Tobacco Use  Smoking status: Never Smoker  Smokeless tobacco: Current User Substance Use Topics  Alcohol use: No  
   
 
Current Outpatient Medications:  
  riluzole (Rilutek) tablet, Take 1 Tablet by mouth every twelve (12) hours. , Disp: 60 Tablet, Rfl: 11 
  aspirin delayed-release 325 mg tablet, Take 325 mg by mouth daily. , Disp: , Rfl:  
  cholecalciferol (Vitamin D3) 25 mcg (1,000 unit) cap, Take  by mouth daily. , Disp: , Rfl:  
  multivitamin, tx-iron-ca-min (THERA-M w/ IRON) 9 mg iron-400 mcg tab tablet, Take 1 Tab by mouth daily. , Disp: , Rfl:  
 
 
 
No Known Allergies MRI Results (most recent): 
Results from [facility] Encounter encounter on 02/26/21 MRA BRAIN WO CONT Narrative EXAM: MRA BRAIN WO CONT INDICATION:   Dementia, memory disturbance. COMPARISON:  MRI brain 2/5/2021 CONTRAST:  None. TECHNIQUE: 
3-D time-of-flight noncontrast MRA of the brain was performed. Multiplanar and 
MIP reconstructions were obtained.  
 
FINDINGS: 
There is no evidence of large vessel occlusion or flow-limiting stenosis of the 
intracranial internal carotid, anterior cerebral, and middle cerebral arteries. The anterior communicating artery is patent. There is no evidence of large vessel occlusion or flow-limiting stenosis of the 
intracranial vertebral arteries, basilar artery, or posterior cerebral arteries. The posterior communicating arteries are patent, right larger than left. There is no evidence of aneurysm or vascular malformation. Impression 1. No evidence of significant stenosis or aneurysm. Results from [facility] Encounter encounter on 02/26/21 MRA BRAIN WO CONT Narrative EXAM: MRA BRAIN WO CONT INDICATION:   Dementia, memory disturbance. COMPARISON:  MRI brain 2/5/2021 CONTRAST:  None. TECHNIQUE: 
3-D time-of-flight noncontrast MRA of the brain was performed. Multiplanar and 
MIP reconstructions were obtained. FINDINGS: 
There is no evidence of large vessel occlusion or flow-limiting stenosis of the 
intracranial internal carotid, anterior cerebral, and middle cerebral arteries. The anterior communicating artery is patent. There is no evidence of large vessel occlusion or flow-limiting stenosis of the 
intracranial vertebral arteries, basilar artery, or posterior cerebral arteries. The posterior communicating arteries are patent, right larger than left. There is no evidence of aneurysm or vascular malformation. Impression 1. No evidence of significant stenosis or aneurysm. Review of Systems: A comprehensive review of systems was negative except for: Musculoskeletal: positive for myalgias, arthralgias, stiff joints and muscle weakness Neurological: positive for memory problems, speech problems, paresthesia, coordination problems, gait problems and weakness Behvioral/Psych: positive for anxiety and depression Vitals:  
 06/17/21 1122 BP: 120/80 Pulse: 90 SpO2: 96% Weight: 97 lb (44 kg) Height: 5' 2\" (1.575 m) Objective: I 
 
 
NEUROLOGICAL EXAM: 
 
Appearance:   The patient is thinly developed and nourished, provides a fair history and is in no acute distress. Mental Status: Oriented to time, place and person, and the president, cognitive function is slow and mildly abnormal and speech is fluent and questionable mild aphasia and has a mild dysarthria. Mood and affect appropriate. Cranial Nerves:   Intact visual fields. Fundi are benign, disc are flat, no lesions seen on funduscopy. LISSA, EOM's full, but occasionally seems to have a slight skew deviation to the eyes. No nystagmus, no ptosis. Facial sensation is normal. Corneal reflexes are not tested. Facial movement is symmetric. Hearing is abnormal bilaterally. Palate is midline with normal sternocleidomastoid and trapezius muscles are normal. Tongue is midline. Neck without meningismus or bruits Temporal arteries are not tender or enlarged TMJ areas are not tender on palpation Motor:  4/5 strength in upper and lower proximal and distal muscles on the right, and about 3/5 strength in the left leg and proximal arm, with left arm being 2/5 in the left hand1/5. Darletta Marvin Normal bulk and and tone on the right side and increased tone with slight reduced muscle bulk in the left hand. Mild fasciculations seen occasionally in the left first dorsal interosseous and in the left shoulder musculature, and possibly occasionally in the left lower leg. Darletta Marvin Rapid alternating movement is asymmetric and slow on the left foot and hand Reflexes:   Deep tendon reflexes 2+/4 on the right and 3+/4 on the left No babinski or clonus present Sensory:   Normal to touch, pinprick and vibration and temperature. DSS is intact Gait:  Abnormal gait with patient having a somewhat unsteady gait, with a clear left spastic hemiparetic gait. Tremor:   No tremor noted.   
Cerebellar:   Mildly abnormal cerebellar signs present on Romberg and tandem testing and finger-nose-finger exam.  
Neurovascular:  Normal heart sounds and regular rhythm, peripheral pulses decreased and no carotid bruits. Assessment: ICD-10-CM ICD-9-CM 1. Amyotrophic lateral sclerosis (ALS) ([facility])  G12.21 335.20 REFERRAL TO SPEECH THERAPY REFERRAL TO PULMONARY DISEASE  
   riluzole (Rilutek) tablet REFERRAL TO HOSPICE Active Problems: * No active hospital problems. * 
 
 
Plan:  
 
Patient with EMG study and does indeed suggest ALS, and the patient is progressing fairly rapidly, having difficulty with double vision, difficulty breathing some difficulty swallowing and wants to be a DNR and no feeding tubes we will send her to hospice care and contact the [facility] for a helping her stay at home and be managed in a palliative care way. We will send letters to the bank and her insurance to try to get things arranged so her son can handle her at home. She will need a hospital bed of is a hospice people can give her. Patient did have some complaints of word finding speech difficulty, but her MRI scan does not show any frontal temporal atrophy to suggest frontotemporal dementia or primary progressive aphasia and her neuropsych testing was basically with normal aging, but 1 always wonders about ALS frontotemporal dementia complex, and she may be a candidate for the orf gene complex We discussed her condition with the patient and her son in detail, and obviously she is high risk for further stroke neurologic deficit and dysfunction she has either a degenerative brain condition Very difficult case, 45 minutes met with the patient and her son, and we talked in detail about her diagnosis, prognosis, treatment options, and Dr. Mony Drew says she is not a candidate for Radha Check because she is probably already too far gone, we will try her on Rilutek 50 mg twice a day to see if that helps a little, she was referred to Dr. Shahid Gilbert for her breathing problems. Again she was referred to the [facility] and hospice care because she does not want aggressive treatment.  
Metabolic studies also done to rule out any cause of her dementia like B12 levels, and connective tissue screen done to rule out arteritis or vasculitis as a cause of her symptoms also. Patient has very serious problem as she has a progressive degenerative fatal brain condition like ALS that is uniformly fatal and with marked disability and loss of function which she is already having, and we advised the son that we would fill out long-term care policy for her to help her get help at home. 45 minutes spent with the patient and her son going over her work-up reviewing her records, examining her and extra careful detail exam and planning work-up as above. Follow-up in 6 weeks time or earlier as need be. Signed By: Ellie Lowe MD   
 June 17, 2021 CC: Marquez Gan MD 
FAX: [phone] If you have questions, please do not hesitate to call me. I look forward to following your patient along with you. Sincerely, Ellie Lowe MD

## 2021-06-17 NOTE — LETTER
6/17/2021 1:20 PM 
 
Ms. Sidney Perez [address] [facility] [place] [id number] Mrs. 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 Her son needs to make his repairs to her house so she can be cared for at home in a palliative manner from this terminal disease. If I can be of any further assistance please let me know. Sincerely, Shelli Cheadle, MD

## 2021-06-18 ENCOUNTER — HOSPICE ADMISSION (OUTPATIENT)
Dept: HOSPICE | Facility: HOSPICE | Age: 81
End: 2021-06-18

## 2021-06-21 DIAGNOSIS — G12.21 AMYOTROPHIC LATERAL SCLEROSIS (ALS) (HCC): Primary | ICD-10-CM

## 2021-06-21 RX ORDER — GABAPENTIN 300 MG/1
300-600 CAPSULE ORAL
Qty: 100 CAPSULE | Refills: 5 | Status: SHIPPED | OUTPATIENT
Start: 2021-06-21

## 2021-06-22 ENCOUNTER — TELEPHONE (OUTPATIENT)
Dept: NEUROLOGY | Age: 81
End: 2021-06-22

## 2022-03-18 PROBLEM — E53.8 B12 DEFICIENCY: Status: ACTIVE | Noted: 2021-02-15

## 2022-03-18 PROBLEM — M48.02 DEGENERATIVE CERVICAL SPINAL STENOSIS: Status: ACTIVE | Noted: 2021-02-15

## 2022-03-18 PROBLEM — I63.311 THROMBOTIC STROKE INVOLVING RIGHT MIDDLE CEREBRAL ARTERY (HCC): Status: ACTIVE | Noted: 2021-02-15

## 2022-03-19 PROBLEM — E03.4 HYPOTHYROIDISM DUE TO ACQUIRED ATROPHY OF THYROID: Status: ACTIVE | Noted: 2021-02-15

## 2022-03-19 PROBLEM — F03.90 DEMENTIA WITHOUT BEHAVIORAL DISTURBANCE (HCC): Status: ACTIVE | Noted: 2021-02-15

## 2022-03-19 PROBLEM — R41.3 DISTURBANCE OF MEMORY: Status: ACTIVE | Noted: 2021-02-15

## 2022-03-19 PROBLEM — F02.80 PRIMARY PROGRESSIVE APHASIA (HCC): Status: ACTIVE | Noted: 2021-02-15

## 2022-03-19 PROBLEM — G31.01 PRIMARY PROGRESSIVE APHASIA (HCC): Status: ACTIVE | Noted: 2021-02-15

## 2022-03-19 PROBLEM — I65.23 BILATERAL CAROTID ARTERY STENOSIS: Status: ACTIVE | Noted: 2021-02-15

## 2022-03-19 PROBLEM — G81.14 LEFT SPASTIC HEMIPLEGIA (HCC): Status: ACTIVE | Noted: 2021-05-17

## 2022-03-20 PROBLEM — G12.21 AMYOTROPHIC LATERAL SCLEROSIS (ALS) (HCC): Status: ACTIVE | Noted: 2021-05-17

## 2023-05-11 RX ORDER — GABAPENTIN 300 MG/1
CAPSULE ORAL
COMMUNITY
Start: 2021-06-21

## 2023-05-11 RX ORDER — RILUZOLE 50 MG/1
TABLET, FILM COATED ORAL EVERY 12 HOURS
COMMUNITY
Start: 2021-06-17

## 2023-05-11 RX ORDER — ASPIRIN 325 MG
325 TABLET, DELAYED RELEASE (ENTERIC COATED) ORAL DAILY
COMMUNITY